# Patient Record
Sex: MALE | Race: ASIAN | NOT HISPANIC OR LATINO | Employment: STUDENT | URBAN - METROPOLITAN AREA
[De-identification: names, ages, dates, MRNs, and addresses within clinical notes are randomized per-mention and may not be internally consistent; named-entity substitution may affect disease eponyms.]

---
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
## 2018-06-04 ENCOUNTER — OFFICE VISIT (OUTPATIENT)
Dept: FAMILY MEDICINE | Facility: CLINIC | Age: 18
End: 2018-06-04
Payer: COMMERCIAL

## 2018-06-04 VITALS
SYSTOLIC BLOOD PRESSURE: 120 MMHG | WEIGHT: 125 LBS | BODY MASS INDEX: 20.83 KG/M2 | HEIGHT: 65 IN | TEMPERATURE: 99.2 F | DIASTOLIC BLOOD PRESSURE: 78 MMHG | RESPIRATION RATE: 14 BRPM | HEART RATE: 60 BPM

## 2018-06-04 DIAGNOSIS — Z72.51 HIGH RISK SEXUAL BEHAVIOR: ICD-10-CM

## 2018-06-04 DIAGNOSIS — Z11.1 SCREENING EXAMINATION FOR PULMONARY TUBERCULOSIS: Primary | ICD-10-CM

## 2018-06-04 PROCEDURE — 86580 TB INTRADERMAL TEST: CPT | Performed by: NURSE PRACTITIONER

## 2018-06-04 PROCEDURE — 87491 CHLMYD TRACH DNA AMP PROBE: CPT | Performed by: NURSE PRACTITIONER

## 2018-06-04 PROCEDURE — 87389 HIV-1 AG W/HIV-1&-2 AB AG IA: CPT | Performed by: NURSE PRACTITIONER

## 2018-06-04 PROCEDURE — 36415 COLL VENOUS BLD VENIPUNCTURE: CPT | Performed by: NURSE PRACTITIONER

## 2018-06-04 PROCEDURE — 90472 IMMUNIZATION ADMIN EACH ADD: CPT | Performed by: NURSE PRACTITIONER

## 2018-06-04 PROCEDURE — 99212 OFFICE O/P EST SF 10 MIN: CPT | Mod: 25 | Performed by: NURSE PRACTITIONER

## 2018-06-04 PROCEDURE — 87591 N.GONORRHOEAE DNA AMP PROB: CPT | Performed by: NURSE PRACTITIONER

## 2018-06-04 PROCEDURE — 90471 IMMUNIZATION ADMIN: CPT | Performed by: NURSE PRACTITIONER

## 2018-06-04 PROCEDURE — 90651 9VHPV VACCINE 2/3 DOSE IM: CPT | Performed by: NURSE PRACTITIONER

## 2018-06-04 NOTE — MR AVS SNAPSHOT
After Visit Summary   6/4/2018    Óscar Donnelly    MRN: [id number]           Patient Information     Date Of Birth          2000        Visit Information        Provider Department      6/4/2018 8:40 AM Toshia Cristina APRN CNP [facility]        Today's Diagnoses     High risk sexual behavior    -  1       Follow-ups after your visit        Follow-up notes from your care team     Return in about 1 year (around 6/4/2019) for Physical Exam.      Your next 10 appointments already scheduled     Jun 07, 2018  8:00 AM CDT   Nurse Only with FM RN VISITS   [facility]    [address] [place] [address], [address]  [facility] [address] [phone]              Who to contact     If you have questions or need follow up information about today's clinic visit or your schedule please contact [facility] directly at [phone].  Normal or non-critical lab and imaging results will be communicated to you by [employer], letter or phone within 4 business days after the clinic has received the results. If you do not hear from us within 7 days, please contact the clinic through [employer] or phone. If you have a critical or abnormal lab result, we will notify you by phone as soon as possible.  Submit refill requests through [employer] or call your pharmacy and they will forward the refill request to us. Please allow 3 business days for your refill to be completed.          Additional Information About Your Visit        MyChart Information     [employer] gives you secure access to your electronic health record. If you see a primary care provider, you can also send messages to your care team and make appointments. If you have questions, please call your primary care clinic.  If you do not have a primary care provider, please call [phone] and they will assist you.        Care EveryWhere ID     This is your Care EveryWhere ID. This could be  "used by other organizations to access your [place] medical records  [id number]        Your Vitals Were     Pulse Temperature Respirations Height BMI (Body Mass Index)       60 99.2  F (37.3  C) (Oral) 14 5' 5\" (1.651 m) 20.8 kg/m2        Blood Pressure from Last 3 Encounters:   06/04/18 120/78   03/30/17 122/80   01/02/16 112/68    Weight from Last 3 Encounters:   06/04/18 125 lb (56.7 kg)   03/30/17 132 lb (59.9 kg) (17 %)*   01/02/16 125 lb 9.6 oz (57 kg) (15 %)*     * Growth percentiles are based on [facility] 2-20 Years data.              We Performed the Following     Chlamydia trachomatis PCR     HIV Antigen Antibody Combo     Neisseria gonorrhoeae PCR        Primary Care Provider Office Phone # Fax #    Brian Dee [phone]  [phone]        Equal Access to Services     [facility]: Hadii aad ku hadasho Soomaali, waaxda luqadaha, qaybta kaalmada adeegyada, theo juárez . So [facility] [phone].    ATENCIÓN: Si habla español, tiene a hyatt disposición servicios gratuitos de asistencia lingüística. Llame al [phone].    We comply with applicable federal civil rights laws and [place] laws. We do not discriminate on the basis of race, color, national origin, age, disability, sex, sexual orientation, or gender identity.            Thank you!     Thank you for choosing [facility]  for your care. Our goal is always to provide you with excellent care. Hearing back from our patients is one way we can continue to improve our services. Please take a few minutes to complete the written survey that you may receive in the mail after your visit with us. Thank you!             Your Updated Medication List - Protect others around you: Learn how to safely use, store and throw away your medicines at www.disposemymeds.org.      Notice  As of 6/4/2018  8:56 AM    You have not been prescribed any medications.      "

## 2018-06-04 NOTE — NURSING NOTE
The patient is asked the following questions today and these are his answers:    -Have you had a mantoux administered in the past 30 days?    No  -Have you had a previous positive Mantoux.  No  -Have you received BCG in the past.  No  -Have you had a live vaccine  (MMR, Varicella, OPV, Yellow Fever) in the last 6 weeks.  No  -Have you had and active  viral or bacterial infection in the past 6 weeks.  No  -Have you received corticosteroids or immunosuppressive agents in the past 6 weeks.  No  -Have you been diagnosed with HIV?  No  -Do you have a maglinancy?  No   .Tamy BIRCH MA

## 2018-06-04 NOTE — PROGRESS NOTES
"  SUBJECTIVE:   Óscar Donnelly is a 18 year old male who presents to clinic today for the following health issues:      Requesting std testing as a precaution. No current symptoms, no recent exposure to anything known of.  He has had new partners recently, but is not in a relationship.    Male partners.    Using condoms.        Problem list and histories reviewed & adjusted, as indicated.  Additional history: as documented    No current outpatient prescriptions on file.     No Known Allergies    Reviewed and updated as needed this visit by clinical staff  Tobacco  Allergies  Meds  Med Hx  Surg Hx  Fam Hx  Soc Hx      Reviewed and updated as needed this visit by Provider         ROS:  Constitutional, HEENT, cardiovascular, pulmonary, gi and gu systems are negative, except as otherwise noted.    OBJECTIVE:     /78 (BP Location: Right arm, Patient Position: Chair, Cuff Size: Adult Regular)  Pulse 60  Temp 99.2  F (37.3  C) (Oral)  Resp 14  Ht 5' 5\" (1.651 m)  Wt 125 lb (56.7 kg)  BMI 20.8 kg/m2  Body mass index is 20.8 kg/(m^2).  GENERAL: healthy, alert and no distress  PSYCH: mentation appears normal, affect normal/bright      Diagnostic Test Results:  none     ASSESSMENT/PLAN:   1. High risk sexual behavior  Reviewed protection against STDs.  - Neisseria gonorrhoeae PCR  - Chlamydia trachomatis PCR  - HIV Antigen Antibody Combo    F/u as needed     JAIDA Raymundo [facility]    "

## 2018-06-05 LAB
C TRACH DNA SPEC QL NAA+PROBE: NEGATIVE
HIV 1+2 AB+HIV1 P24 AG SERPL QL IA: NONREACTIVE
N GONORRHOEA DNA SPEC QL NAA+PROBE: NEGATIVE
SPECIMEN SOURCE: NORMAL
SPECIMEN SOURCE: NORMAL

## 2018-06-06 ENCOUNTER — ALLIED HEALTH/NURSE VISIT (OUTPATIENT)
Dept: NURSING | Facility: CLINIC | Age: 18
End: 2018-06-06
Payer: COMMERCIAL

## 2018-06-06 DIAGNOSIS — Z11.1 SCREENING EXAMINATION FOR PULMONARY TUBERCULOSIS: Primary | ICD-10-CM

## 2018-06-06 LAB
PPDINDURATION: 0 MM (ref 0–5)
PPDREDNESS: 0 MM

## 2018-06-06 PROCEDURE — 99207 ZZC NO CHARGE NURSE ONLY: CPT

## 2018-06-06 NOTE — MR AVS SNAPSHOT
After Visit Summary   6/6/2018    Óscar Donnelly    MRN: [id number]           Patient Information     Date Of Birth          2000        Visit Information        Provider Department      6/6/2018 2:15 PM FM RN VISITS [facility]        Today's Diagnoses     Screening examination for pulmonary tuberculosis    -  1       Follow-ups after your visit        Who to contact     If you have questions or need follow up information about today's clinic visit or your schedule please contact [facility] directly at [phone].  Normal or non-critical lab and imaging results will be communicated to you by [employer], letter or phone within 4 business days after the clinic has received the results. If you do not hear from us within 7 days, please contact the clinic through [employer] or phone. If you have a critical or abnormal lab result, we will notify you by phone as soon as possible.  Submit refill requests through [employer] or call your pharmacy and they will forward the refill request to us. Please allow 3 business days for your refill to be completed.          Additional Information About Your Visit        MyChart Information     [employer] gives you secure access to your electronic health record. If you see a primary care provider, you can also send messages to your care team and make appointments. If you have questions, please call your primary care clinic.  If you do not have a primary care provider, please call [phone] and they will assist you.        Care EveryWhere ID     This is your Care EveryWhere ID. This could be used by other organizations to access your [place] medical records  [id number]         Blood Pressure from Last 3 Encounters:   06/04/18 120/78   03/30/17 122/80   01/02/16 112/68    Weight from Last 3 Encounters:   06/04/18 125 lb (56.7 kg)   03/30/17 132 lb (59.9 kg) (17 %)*   01/02/16 125 lb 9.6 oz (57 kg) (15 %)*     * Growth percentiles are based  on [facility] 2-20 Years data.              Today, you had the following     No orders found for display       Primary Care Provider Office Phone # Fax #    Brian Dee [phone] [address] [facility] [address]        Equal Access to Services     EDUARDOILSA RODOLFO : Hadii aad ku hadasho Soomaali, waaxda luqadaha, qaybta kaalmada adeegyada, waxtianna merlosin hayaan ademadonna valenzuelakathejeremy lafannie ah. So [facility] [phone].    ATENCIÓN: Si habla español, tiene a hyatt disposición servicios gratuitos de asistencia lingüística. Llame al [phone].    We comply with applicable federal civil rights laws and [place] laws. We do not discriminate on the basis of race, color, national origin, age, disability, sex, sexual orientation, or gender identity.            Thank you!     Thank you for choosing [facility]  for your care. Our goal is always to provide you with excellent care. Hearing back from our patients is one way we can continue to improve our services. Please take a few minutes to complete the written survey that you may receive in the mail after your visit with us. Thank you!             Your Updated Medication List - Protect others around you: Learn how to safely use, store and throw away your medicines at www.disposemymeds.org.      Notice  As of 6/6/2018  2:19 PM    You have not been prescribed any medications.

## 2018-06-06 NOTE — PROGRESS NOTES
Mantoux results:   No induration.  No swelling.  No redness.    Signed [facility]. Gave patient print out for own records.     Mayra MEYERS RN, BSN, PHN  Joelle Kamara RN

## 2019-11-08 ENCOUNTER — HEALTH MAINTENANCE LETTER (OUTPATIENT)
Age: 19
End: 2019-11-08

## 2020-01-11 ENCOUNTER — OFFICE VISIT (OUTPATIENT)
Dept: FAMILY MEDICINE | Facility: CLINIC | Age: 20
End: 2020-01-11
Payer: COMMERCIAL

## 2020-01-11 VITALS
HEIGHT: 64 IN | OXYGEN SATURATION: 97 % | DIASTOLIC BLOOD PRESSURE: 78 MMHG | SYSTOLIC BLOOD PRESSURE: 128 MMHG | TEMPERATURE: 97.9 F | BODY MASS INDEX: 22.2 KG/M2 | HEART RATE: 97 BPM | WEIGHT: 130 LBS

## 2020-01-11 DIAGNOSIS — Z11.3 ROUTINE SCREENING FOR STI (SEXUALLY TRANSMITTED INFECTION): ICD-10-CM

## 2020-01-11 DIAGNOSIS — J02.9 SORE THROAT: Primary | ICD-10-CM

## 2020-01-11 PROCEDURE — 87880 STREP A ASSAY W/OPTIC: CPT | Performed by: NURSE PRACTITIONER

## 2020-01-11 PROCEDURE — 86780 TREPONEMA PALLIDUM: CPT | Performed by: NURSE PRACTITIONER

## 2020-01-11 PROCEDURE — 87081 CULTURE SCREEN ONLY: CPT | Performed by: NURSE PRACTITIONER

## 2020-01-11 PROCEDURE — 86696 HERPES SIMPLEX TYPE 2 TEST: CPT | Performed by: NURSE PRACTITIONER

## 2020-01-11 PROCEDURE — 36415 COLL VENOUS BLD VENIPUNCTURE: CPT | Performed by: NURSE PRACTITIONER

## 2020-01-11 PROCEDURE — 87389 HIV-1 AG W/HIV-1&-2 AB AG IA: CPT | Performed by: NURSE PRACTITIONER

## 2020-01-11 PROCEDURE — 99213 OFFICE O/P EST LOW 20 MIN: CPT | Performed by: NURSE PRACTITIONER

## 2020-01-11 PROCEDURE — 86308 HETEROPHILE ANTIBODY SCREEN: CPT | Performed by: NURSE PRACTITIONER

## 2020-01-11 PROCEDURE — 86695 HERPES SIMPLEX TYPE 1 TEST: CPT | Performed by: NURSE PRACTITIONER

## 2020-01-11 ASSESSMENT — ENCOUNTER SYMPTOMS
CHILLS: 1
DIARRHEA: 0
LIGHT-HEADEDNESS: 1
SORE THROAT: 1
DYSURIA: 0
NAUSEA: 0
VOMITING: 0
RHINORRHEA: 0
FATIGUE: 1
HEADACHES: 1
MYALGIAS: 1
HEMATURIA: 0
FEVER: 0
COUGH: 1

## 2020-01-11 ASSESSMENT — MIFFLIN-ST. JEOR: SCORE: 1505.68

## 2020-01-11 NOTE — PROGRESS NOTES
"SUBJECTIVE:   Óscar Donnelly is a 19 year old male presenting with a chief complaint of   Chief Complaint   Patient presents with     Pharyngitis     x 1 week       He is an established patient of [facility].    URI Adult    Onset of symptoms was 1 week(s) ago.  Course of illness is worsening.    Severity moderate  Current and Associated symptoms: chills, ear pain bilateral, sore throat, headache and body aches  Treatment measures tried include Tylenol/Ibuprofen and dayquil.  Predisposing factors include ill contact: roommate and seasonal allergies.  Denies history of asthma, he is a non-smoker.       Also, he requests STI screening. He denies known exposure to STIs. He denies any current urinary symptoms.     Review of Systems   Constitutional: Positive for chills and fatigue. Negative for fever.   HENT: Positive for ear pain and sore throat. Negative for congestion and rhinorrhea.    Respiratory: Positive for cough.    Gastrointestinal: Negative for diarrhea, nausea and vomiting.   Genitourinary: Negative for dysuria, hematuria, penile pain, penile swelling and testicular pain.   Musculoskeletal: Positive for myalgias.   Skin: Negative for rash.   Neurological: Positive for light-headedness and headaches.       History reviewed. No pertinent past medical history.  History reviewed. No pertinent family history.  No current outpatient medications on file.     Social History     Tobacco Use     Smoking status: Never Smoker     Smokeless tobacco: Never Used   Substance Use Topics     Alcohol use: Yes     Comment: occasional/social        OBJECTIVE  /78   Pulse 97   Temp 97.9  F (36.6  C) (Oral)   Ht 1.626 m (5' 4\")   Wt 59 kg (130 lb)   SpO2 97%   BMI 22.31 kg/m      Physical Exam  Vitals signs and nursing note reviewed.   Constitutional:       Appearance: Normal appearance. He is well-developed.   HENT:      Head: Normocephalic and atraumatic.      Right Ear: Ear canal and external ear normal. A middle ear " effusion is present.      Left Ear: Ear canal and external ear normal. A middle ear effusion is present.      Nose: Nose normal. No congestion or rhinorrhea.      Right Sinus: No maxillary sinus tenderness or frontal sinus tenderness.      Left Sinus: No maxillary sinus tenderness or frontal sinus tenderness.      Mouth/Throat:      Pharynx: Posterior oropharyngeal erythema present. No oropharyngeal exudate.      Tonsils: No tonsillar exudate. Swellin+ on the right. 2+ on the left.   Eyes:      Extraocular Movements: Extraocular movements intact.      Conjunctiva/sclera: Conjunctivae normal.      Pupils: Pupils are equal, round, and reactive to light.   Neck:      Musculoskeletal: Normal range of motion and neck supple.   Cardiovascular:      Rate and Rhythm: Normal rate and regular rhythm.      Heart sounds: Normal heart sounds.   Pulmonary:      Effort: Pulmonary effort is normal.      Breath sounds: Normal breath sounds and air entry.   Abdominal:      Hernia: There is no hernia in the right inguinal area or left inguinal area.   Genitourinary:     Penis: Normal.       Scrotum/Testes: Normal. Cremasteric reflex is present.      Epididymis:      Right: Normal.      Left: Normal.   Lymphadenopathy:      Head:      Right side of head: No submandibular or tonsillar adenopathy.      Left side of head: No submandibular or tonsillar adenopathy.      Cervical: No cervical adenopathy.   Skin:     General: Skin is warm and dry.   Neurological:      Mental Status: He is alert and oriented to person, place, and time.   Psychiatric:         Behavior: Behavior is cooperative.         Labs:  Results for orders placed or performed in visit on 20 (from the past 24 hour(s))   Beta strep group A culture   Result Value Ref Range    Specimen Description Throat     Culture Micro       NEGATIVE: No Group A streptococcal antigen detected by immunoassay, await culture report.   Rapid strep screen   Result Value Ref Range     Specimen Description Throat     Rapid Strep A Screen       NEGATIVE: No Group A streptococcal antigen detected by immunoassay, await culture report.         ASSESSMENT:      ICD-10-CM    1. Sore throat J02.9 Beta strep group A culture     Rapid strep screen     Mononucleosis screen     CANCELED: Beta strep group A culture   2. Routine screening for STI (sexually transmitted infection) Z11.3 NEISSERIA GONORRHOEA PCR     CHLAMYDIA TRACHOMATIS PCR     HIV Antigen Antibody Combo     Herpes Simplex Virus 1 and 2 IgG     Treponema Abs w Reflex to RPR and Titer        Medical Decision Making:    Differential Diagnosis:  URI Adult/Peds:  Acute right otitis media, Acute left otitis media, Laryngitis, Mononucleosis, Strep pharyngitis, Tonsilitis, Viral pharyngitis and Viral upper respiratory illness    Serious Comorbid Conditions:  Adult:  None    PLAN:  Discussed with patient that the rapid strep was negative. Will do confirmatory testing. Did recommend doing a mono screen. That was also negative. Favor a viral sore throat. Symptomatic treatment recommendations discussed. Will notify via my chart of negative STI testing or notify via phone if positive screens. Education was added to AVS. Patient was agreeable to plan and verbalized understanding.     Followup:    If not improving in 5-7 days or if condition worsens, follow up with your Primary Care Provider    Patient Instructions   Push Fluids  Plenty of rest  Tylenol and ibuprofen to help with pain or fever  Humidified air can help with congestion  Nasal saline or Flonase nasal spray to help with congestion  Salt water gargles, anesthetic throat spray, or lozenges to help with sore throat.

## 2020-01-12 LAB
BACTERIA SPEC CULT: NORMAL
HETEROPH AB SER QL: NEGATIVE
SPECIMEN SOURCE: NORMAL

## 2020-01-13 LAB
HIV 1+2 AB+HIV1 P24 AG SERPL QL IA: NONREACTIVE
HSV1 IGG SERPL QL IA: >8 AI (ref 0–0.8)
HSV2 IGG SERPL QL IA: <0.2 AI (ref 0–0.8)
T PALLIDUM AB SER QL: NONREACTIVE

## 2020-01-14 ENCOUNTER — TELEPHONE (OUTPATIENT)
Dept: FAMILY MEDICINE | Facility: CLINIC | Age: 20
End: 2020-01-14

## 2020-01-14 DIAGNOSIS — Z11.3 SCREEN FOR SEXUALLY TRANSMITTED DISEASES: Primary | ICD-10-CM

## 2020-01-14 LAB
C TRACH DNA SPEC QL NAA+PROBE: ABNORMAL
N GONORRHOEA DNA SPEC QL NAA+PROBE: ABNORMAL
SPECIMEN SOURCE: ABNORMAL
SPECIMEN SOURCE: ABNORMAL

## 2020-01-14 NOTE — TELEPHONE ENCOUNTER
Called patient and he was seen on Saturday 1/11/2020; urine GC/Chlamydia was not sent out for testing due to inadequate amount of urine collection.  Patient states that he will come in at his convenience and informed him to not urinate 1 hour before the collection. Patient verbalizes understanding and order will placed in his chart.

## 2020-01-19 LAB
DEPRECATED S PYO AG THROAT QL EIA: NORMAL
SPECIMEN SOURCE: NORMAL

## 2020-09-24 ENCOUNTER — OFFICE VISIT (OUTPATIENT)
Dept: OPHTHALMOLOGY | Facility: CLINIC | Age: 20
End: 2020-09-24
Payer: COMMERCIAL

## 2020-09-24 DIAGNOSIS — H52.13 MYOPIA OF BOTH EYES: Primary | ICD-10-CM

## 2020-09-24 DIAGNOSIS — D31.32 NEVUS OF CHOROID OF LEFT EYE: ICD-10-CM

## 2020-09-24 ASSESSMENT — REFRACTION_WEARINGRX
OS_CYLINDER: +0.50
OD_CYLINDER: +0.50
OS_AXIS: 170
OD_AXIS: 005
OD_SPHERE: -1.75
OS_SPHERE: -1.75
SPECS_TYPE: SVL

## 2020-09-24 ASSESSMENT — REFRACTION_MANIFEST
OS_CYLINDER: SPHERE
OD_CYLINDER: SPHERE
OD_SPHERE: -1.75
OS_SPHERE: -1.75

## 2020-09-24 ASSESSMENT — CONF VISUAL FIELD
OS_NORMAL: 1
METHOD: COUNTING FINGERS
OD_NORMAL: 1

## 2020-09-24 ASSESSMENT — CUP TO DISC RATIO
OS_RATIO: 0.35
OD_RATIO: 0.35

## 2020-09-24 ASSESSMENT — TONOMETRY
OS_IOP_MMHG: 11
IOP_METHOD: ICARE
OD_IOP_MMHG: 11

## 2020-09-24 ASSESSMENT — VISUAL ACUITY
OD_CC: 20/20
METHOD: SNELLEN - LINEAR
CORRECTION_TYPE: GLASSES
OS_CC: 20/20

## 2020-09-24 ASSESSMENT — EXTERNAL EXAM - RIGHT EYE: OD_EXAM: NORMAL

## 2020-09-24 ASSESSMENT — EXTERNAL EXAM - LEFT EYE: OS_EXAM: NORMAL

## 2020-09-24 ASSESSMENT — SLIT LAMP EXAM - LIDS
COMMENTS: NORMAL
COMMENTS: NORMAL

## 2020-09-24 NOTE — PROGRESS NOTES
A/P  1.) Myopia each eye  -Largely stable spec Rx, okay to continue with current glasses  -Current CL wearer, defers eval today  -Interested in LASIK. Discussed with pt today. Rec eval with LASIK provider when convenient    2.) Choroidal nevus left eye  -Small, nonsuspicious  -Reviewed with pt, monitor annually with DFE    Monitor 1 year routine    I have confirmed the patient's CC, HPI and reviewed Past Medical History, Past Surgical History, Social History, Family History, Problem List, Medication List and agree with Tech note.     Ivania Perez, OD FAAO FSLS

## 2020-09-24 NOTE — NURSING NOTE
Chief Complaints and History of Present Illnesses   Patient presents with     COMPREHENSIVE EYE EXAM     Chief Complaint(s) and History of Present Illness(es)     COMPREHENSIVE EYE EXAM     Laterality: both eyes    Onset: years ago    Frequency: constantly    Course: stable    Treatments tried: artificial tears    Pain scale: 0/10              Comments     Annual exam. Pt states vision is stable. Would like to get lasik- pass on contacts today. No pain. AT while wearing contacts.    MARIA D Palacio COT 1:39 PM September 24, 2020

## 2020-12-06 ENCOUNTER — HEALTH MAINTENANCE LETTER (OUTPATIENT)
Age: 20
End: 2020-12-06

## 2020-12-22 ENCOUNTER — OFFICE VISIT (OUTPATIENT)
Dept: FAMILY MEDICINE | Facility: CLINIC | Age: 20
End: 2020-12-22
Payer: COMMERCIAL

## 2020-12-22 VITALS
TEMPERATURE: 97.6 F | HEART RATE: 57 BPM | SYSTOLIC BLOOD PRESSURE: 142 MMHG | OXYGEN SATURATION: 98 % | HEIGHT: 65 IN | RESPIRATION RATE: 16 BRPM | DIASTOLIC BLOOD PRESSURE: 87 MMHG | WEIGHT: 140 LBS | BODY MASS INDEX: 23.32 KG/M2

## 2020-12-22 DIAGNOSIS — Z00.00 ROUTINE GENERAL MEDICAL EXAMINATION AT A HEALTH CARE FACILITY: Primary | ICD-10-CM

## 2020-12-22 DIAGNOSIS — Z20.6 HIV EXPOSURE: ICD-10-CM

## 2020-12-22 DIAGNOSIS — G47.26 SHIFT WORK SLEEP DISORDER: ICD-10-CM

## 2020-12-22 PROCEDURE — 86780 TREPONEMA PALLIDUM: CPT | Mod: 90 | Performed by: FAMILY MEDICINE

## 2020-12-22 PROCEDURE — 87591 N.GONORRHOEAE DNA AMP PROB: CPT | Performed by: FAMILY MEDICINE

## 2020-12-22 PROCEDURE — 99395 PREV VISIT EST AGE 18-39: CPT | Mod: 25 | Performed by: FAMILY MEDICINE

## 2020-12-22 PROCEDURE — 90715 TDAP VACCINE 7 YRS/> IM: CPT | Performed by: FAMILY MEDICINE

## 2020-12-22 PROCEDURE — 99000 SPECIMEN HANDLING OFFICE-LAB: CPT | Performed by: FAMILY MEDICINE

## 2020-12-22 PROCEDURE — 99213 OFFICE O/P EST LOW 20 MIN: CPT | Mod: 25 | Performed by: FAMILY MEDICINE

## 2020-12-22 PROCEDURE — 87491 CHLMYD TRACH DNA AMP PROBE: CPT | Performed by: FAMILY MEDICINE

## 2020-12-22 PROCEDURE — 90471 IMMUNIZATION ADMIN: CPT | Performed by: FAMILY MEDICINE

## 2020-12-22 PROCEDURE — 36415 COLL VENOUS BLD VENIPUNCTURE: CPT | Performed by: FAMILY MEDICINE

## 2020-12-22 PROCEDURE — 80048 BASIC METABOLIC PNL TOTAL CA: CPT | Performed by: FAMILY MEDICINE

## 2020-12-22 PROCEDURE — 87389 HIV-1 AG W/HIV-1&-2 AB AG IA: CPT | Performed by: FAMILY MEDICINE

## 2020-12-22 RX ORDER — ZOLPIDEM TARTRATE 5 MG/1
5 TABLET ORAL
Qty: 30 TABLET | Refills: 1 | Status: SHIPPED | OUTPATIENT
Start: 2020-12-22 | End: 2022-04-25

## 2020-12-22 RX ORDER — EMTRICITABINE AND TENOFOVIR DISOPROXIL FUMARATE 200; 300 MG/1; MG/1
1 TABLET, FILM COATED ORAL DAILY
Qty: 90 TABLET | Refills: 0 | Status: SHIPPED | OUTPATIENT
Start: 2020-12-22 | End: 2021-01-20

## 2020-12-22 ASSESSMENT — MIFFLIN-ST. JEOR: SCORE: 1557.95

## 2020-12-22 NOTE — PROGRESS NOTES
SUBJECTIVE:   CC: Óscar Donnelly is an 20 year old male who presents for preventative health visit.       Patient has been advised of split billing requirements and indicates understanding: Yes  Healthy Habits:     Getting at least 3 servings of Calcium per day:  Yes    Bi-annual eye exam:  Yes    Dental care twice a year:  NO    Sleep apnea or symptoms of sleep apnea:  Daytime drowsiness    Diet:  Regular (no restrictions)    Frequency of exercise:  2-3 days/week    Duration of exercise:  45-60 minutes    Taking medications regularly:  Yes    Medication side effects:  Not applicable    PHQ-2 Total Score: 2    Additional concerns today:  Yes        in addition to health maintenance patient would like to discuss the following problem:    Sleep problems ongoing for the past couple of months gotten worst the last couple of weeks has been doing melatonin at night 5mg OTC but has stopped helping symptoms patient also works nights.   Most recently 2 weeks on 1 week off, always nights.    Week off is reverting to a more normal schedule    Discussed potential for impairment and dependence with benzo/Z medications, a limited Rx prescribed      Also, routine STD screening.  Also interested in prep    Reviewed protocol and indications for use    PrEP is for  anyone who is in an ongoing relationship with an HIV-positive partner. It also includes anyone who 1) is not in a mutually monogamous relationship with a partner who recently tested HIV-negative, and   2) is a kennedy or bisexual man who has had anal sex without a condom or been diagnosed with an STD in the past 6 months; or  heterosexual man or woman who does not regularly use condoms during sex with partners of unknown HIV status who are at substantial risk of HIV infection (e.g., people who inject drugs or have bisexual male partners).    Patient meets criteria    He is not having any side effects     Confirmed a negative HIV-1 test immediately prior to continuing  TRUVADA/Descovy for a PrEP indication  Confirmed hepatitis B immunity  Rechecking creatinine clearance (CrCl) >60 mL/min during treatment.   Confirmed that the uninfected individual at high risk is not taking other HIV-1 medications or HBV medications   Evaluated risk/benefit     Counseling/Follow-up   Discussed known safety risks with use of TRUVADA/Descovy for a PrEP indication   Counseled on the importance of scheduled follow-up every 2 to 3 months, including regular HIV-1 screening tests  (at least every 3 months), while taking TRUVADA/Descovy for a PrEP indication to reconfirm [id number] status   Discussed the importance of discontinuing TRUVADA/Descovy for a PrEP indication if seroconversion has occurred, to reduce the development of resistant HIV-1 variants   Counseled on the importance of adherence to daily dosing schedule   Counseled that TRUVADA/Descovy for a PrEP indication should be used only as part of a comprehensive prevention strategy   Educated on practicing safer sex consistently and using condoms correctly   Discussed the importance of the individual knowing their HIV-1 status and, if possible, that of their partner(s)   Discussed the importance of and performed screening for sexually transmitted infections (STIs), such as syphilis  and gonorrhea, that can facilitate HIV-1 transmission   Offered HBV vaccination as appropriate   Provided education on where information about TRUVADA/Descovy for a PrEP indication can be accessed   Discussed potential adverse reactions          Today's PHQ-2 Score:   PHQ-2 ( 1999 Pfizer) 12/17/2020   Q1: Little interest or pleasure in doing things 1   Q2: Feeling down, depressed or hopeless 1   PHQ-2 Score 2   Q1: Little interest or pleasure in doing things Several days   Q2: Feeling down, depressed or hopeless Several days   PHQ-2 Score 2       Abuse: Current or Past(Physical, Sexual or Emotional)- No  Do you feel safe in your environment? Yes        Social  History     Tobacco Use     Smoking status: Never Smoker     Smokeless tobacco: Never Used   Substance Use Topics     Alcohol use: Yes     Comment: occasional/social      If you drink alcohol do you typically have >3 drinks per day or >7 drinks per week? No    Alcohol Use 12/17/2020   Prescreen: >3 drinks/day or >7 drinks/week? No   No flowsheet data found.    Last PSA: No results found for: PSA    Reviewed orders with patient. Reviewed health maintenance and updated orders accordingly - Yes  Lab work is in process  Labs reviewed in EPIC  BP Readings from Last 3 Encounters:   12/22/20 (!) 142/87   01/11/20 128/78   06/04/18 120/78    Wt Readings from Last 3 Encounters:   12/22/20 63.5 kg (140 lb)   01/11/20 59 kg (130 lb)   06/04/18 56.7 kg (125 lb)                  Patient Active Problem List   Diagnosis     Acute otitis media     History reviewed. No pertinent surgical history.    Social History     Tobacco Use     Smoking status: Never Smoker     Smokeless tobacco: Never Used   Substance Use Topics     Alcohol use: Yes     Comment: occasional/social      Family History   Problem Relation Age of Onset     Glaucoma No family hx of      Macular Degeneration No family hx of          No current outpatient medications on file.       Reviewed and updated as needed this visit by clinical staff                 Reviewed and updated as needed this visit by Provider                    Review of Systems  10 point ROS of systems including Constitutional, Eyes, Respiratory, Cardiovascular, Gastroenterology, Genitourinary, Integumentary, Muscularskeletal, Psychiatric were all negative except for pertinent positives noted in my HPI.      OBJECTIVE:   There were no vitals taken for this visit.    Physical Exam    General Appearance: Pleasant, alert, in no acute respiratory distress.  Head Exam: Normal. Normocephalic, atraumatic. No sinus tenderness.  Eye Exam: Normal external eye, conjunctiva, lids. ROSIE.  Ear Exam: Normal  "auditory canals and external ears. Non-tender.  Left TM-normal. Right TM-normal.  Neck Exam: Supple, no obvious thyroid enlargement  Chest/Respiratory Exam: Normal, comfortable, easy respirations. Chest wall normal. Lungs are clear to auscultation. No wheezing, crackles, or rhonchi.  Cardiovascular Exam: Regular rate and rhythm. No murmur, gallop, or rubs.  Musculoskeletal Exam: Back is non-tender, full ROM of upper and lower extremities.  Skin: no rash, warm and dry.    Neurologic Exam: Nonfocal, no tremor. Normal gait.  Psychiatric Exam: Alert - appropriate, normal affect      ASSESSMENT/PLAN:       ICD-10-CM    1. Routine general medical examination at a health care facility  Z00.00    2. 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 HIV exposure  Z20.6 Neisseria gonorrhoeae PCR     Treponema Abs w Reflex to RPR and Titer     HIV Antigen Antibody Combo     Chlamydia trachomatis PCR     Basic metabolic panel     emtricitabine-tenofovir (TRUVADA) 200-300 MG per tablet     HIV Antigen Antibody Combo     Creatinine       Patient has been advised of split billing requirements and indicates understanding: Yes  COUNSELING:   Reviewed preventive health counseling, as reflected in patient instructions       Regular exercise       Healthy diet/nutrition    Estimated body mass index is 22.31 kg/m  as calculated from the following:    Height as of 1/11/20: 1.626 m (5' 4\").    Weight as of 1/11/20: 59 kg (130 lb).         He reports that he has never smoked. He has never used smokeless tobacco.      Counseling Resources:  ATP IV Guidelines  Pooled Cohorts Equation Calculator  FRAX Risk Assessment  ICSI Preventive Guidelines  Dietary Guidelines for Americans, 2010  USDA's MyPlate  ASA Prophylaxis  Lung CA Screening    Jovanny Osmani Mota MD  [facility]  "

## 2020-12-23 LAB
ANION GAP SERPL CALCULATED.3IONS-SCNC: 5 MMOL/L (ref 3–14)
BUN SERPL-MCNC: 22 MG/DL (ref 7–30)
C TRACH DNA SPEC QL NAA+PROBE: NEGATIVE
CALCIUM SERPL-MCNC: 9.1 MG/DL (ref 8.5–10.1)
CHLORIDE SERPL-SCNC: 106 MMOL/L (ref 94–109)
CO2 SERPL-SCNC: 29 MMOL/L (ref 20–32)
CREAT SERPL-MCNC: 0.99 MG/DL (ref 0.66–1.25)
GFR SERPL CREATININE-BSD FRML MDRD: >90 ML/MIN/{1.73_M2}
GLUCOSE SERPL-MCNC: 88 MG/DL (ref 70–99)
HIV 1+2 AB+HIV1 P24 AG SERPL QL IA: NONREACTIVE
N GONORRHOEA DNA SPEC QL NAA+PROBE: NEGATIVE
POTASSIUM SERPL-SCNC: 3.7 MMOL/L (ref 3.4–5.3)
SODIUM SERPL-SCNC: 140 MMOL/L (ref 133–144)
SPECIMEN SOURCE: NORMAL
SPECIMEN SOURCE: NORMAL
T PALLIDUM AB SER QL: NONREACTIVE

## 2021-01-20 ENCOUNTER — MYC REFILL (OUTPATIENT)
Dept: FAMILY MEDICINE | Facility: CLINIC | Age: 21
End: 2021-01-20

## 2021-01-20 DIAGNOSIS — Z20.6 HIV EXPOSURE: ICD-10-CM

## 2021-01-20 RX ORDER — EMTRICITABINE AND TENOFOVIR DISOPROXIL FUMARATE 200; 300 MG/1; MG/1
1 TABLET, FILM COATED ORAL DAILY
Qty: 90 TABLET | Refills: 0 | Status: SHIPPED | OUTPATIENT
Start: 2021-01-20 | End: 2022-10-20

## 2021-01-20 NOTE — TELEPHONE ENCOUNTER
Routing refill request to provider for review/approval because:  Drug not on the [facility] refill protocol    Jamila BROOKS RN

## 2021-04-06 ENCOUNTER — OFFICE VISIT (OUTPATIENT)
Dept: FAMILY MEDICINE | Facility: CLINIC | Age: 21
End: 2021-04-06
Payer: COMMERCIAL

## 2021-04-06 VITALS
HEART RATE: 68 BPM | BODY MASS INDEX: 22.49 KG/M2 | TEMPERATURE: 98.3 F | HEIGHT: 65 IN | WEIGHT: 135 LBS | SYSTOLIC BLOOD PRESSURE: 118 MMHG | DIASTOLIC BLOOD PRESSURE: 72 MMHG | OXYGEN SATURATION: 96 %

## 2021-04-06 DIAGNOSIS — Z20.6 HIV EXPOSURE: Primary | ICD-10-CM

## 2021-04-06 PROCEDURE — 87389 HIV-1 AG W/HIV-1&-2 AB AG IA: CPT | Performed by: FAMILY MEDICINE

## 2021-04-06 PROCEDURE — 36415 COLL VENOUS BLD VENIPUNCTURE: CPT | Performed by: FAMILY MEDICINE

## 2021-04-06 PROCEDURE — 82565 ASSAY OF CREATININE: CPT | Performed by: FAMILY MEDICINE

## 2021-04-06 PROCEDURE — 99213 OFFICE O/P EST LOW 20 MIN: CPT | Performed by: FAMILY MEDICINE

## 2021-04-06 RX ORDER — EMTRICITABINE AND TENOFOVIR DISOPROXIL FUMARATE 200; 300 MG/1; MG/1
1 TABLET, FILM COATED ORAL DAILY
Qty: 90 TABLET | Refills: 0 | Status: SHIPPED | OUTPATIENT
Start: 2021-04-06 | End: 2021-07-15

## 2021-04-06 ASSESSMENT — MIFFLIN-ST. JEOR: SCORE: 1530.27

## 2021-04-06 NOTE — NURSING NOTE
"Chief Complaint   Patient presents with     Recheck Medication     initial /72 (BP Location: Right arm, Cuff Size: Adult Regular)   Pulse 68   Temp 98.3  F (36.8  C) (Tympanic)   Ht 1.645 m (5' 4.75\")   Wt 61.2 kg (135 lb)   SpO2 96%   BMI 22.64 kg/m   Estimated body mass index is 22.64 kg/m  as calculated from the following:    Height as of this encounter: 1.645 m (5' 4.75\").    Weight as of this encounter: 61.2 kg (135 lb).  BP completed using cuff size: regular.   R arm      Health Maintenance that is potentially due pending provider review:  NONE    n/a    Matthew Mckay ma  "

## 2021-04-06 NOTE — PROGRESS NOTES
"    Assessment & Plan     HIV exposure    Also Patient is here for follow-up of Truvada/Descovy  Reviewed protocol and indications for use    Patient continues to meet criteria    He is not having any side effects     Confirmed a negative HIV-1 test immediately prior to continuing TRUVADA/Descovy for a PrEP indication  Confirmed hepatitis B immunity  Rechecking creatinine clearance (CrCl) >60 mL/min during treatment.   Confirmed that the uninfected individual at high risk is not taking other HIV-1 medications or HBV medications   Evaluated risk/benefit     Counseling/Follow-up   Discussed known safety risks with use of TRUVADA/Descovy for a PrEP indication   Counseled on the importance of scheduled follow-up every 2 to 3 months, including regular HIV-1 screening tests  (at least every 3 months), while taking TRUVADA/Descovy for a PrEP indication to reconfirm [id number] status     Discussed the importance of the individual knowing their HIV-1 status and, if possible, that of their partner(s)   Discussed the importance of and performed screening for sexually transmitted infections (STIs), such as syphilis  and gonorrhea, that can facilitate HIV-1 transmission       - **HIV Antigen Antibody Combo FUTURE anytime  - Creatinine  - emtricitabine-tenofovir (TRUVADA) 200-300 MG per tablet; Take 1 tablet by mouth daily      No follow-ups on file.    Jovanny Mota MD  [facility]   Óscar is a 21 year old who presents for the following health issues     HPI     Medication Followup of Truvada    Taking Medication as prescribed: yes    Side Effects:  None    Medication Helping Symptoms:  yes             Objective    /72 (BP Location: Right arm, Cuff Size: Adult Regular)   Pulse 68   Temp 98.3  F (36.8  C) (Tympanic)   Ht 1.645 m (5' 4.75\")   Wt 61.2 kg (135 lb)   SpO2 96%   BMI 22.64 kg/m    Body mass index is 22.64 kg/m .  Physical Exam   GENERAL: healthy, alert and " no distress

## 2021-04-07 LAB
CREAT SERPL-MCNC: 1.13 MG/DL (ref 0.66–1.25)
GFR SERPL CREATININE-BSD FRML MDRD: >90 ML/MIN/{1.73_M2}
HIV 1+2 AB+HIV1 P24 AG SERPL QL IA: NONREACTIVE

## 2021-04-08 ENCOUNTER — OFFICE VISIT (OUTPATIENT)
Dept: URGENT CARE | Facility: URGENT CARE | Age: 21
End: 2021-04-08
Payer: COMMERCIAL

## 2021-04-08 ENCOUNTER — RESULTS ONLY (OUTPATIENT)
Dept: LAB | Age: 21
End: 2021-04-08

## 2021-04-08 DIAGNOSIS — Z53.9 ERRONEOUS ENCOUNTER--DISREGARD: Primary | ICD-10-CM

## 2021-04-08 LAB
LABORATORY COMMENT REPORT: NORMAL
SARS-COV-2 RNA RESP QL NAA+PROBE: NEGATIVE
SARS-COV-2 RNA RESP QL NAA+PROBE: NORMAL
SPECIMEN SOURCE: NORMAL
SPECIMEN SOURCE: NORMAL

## 2021-07-14 ENCOUNTER — LAB (OUTPATIENT)
Dept: LAB | Facility: CLINIC | Age: 21
End: 2021-07-14
Payer: COMMERCIAL

## 2021-07-14 ENCOUNTER — MYC REFILL (OUTPATIENT)
Dept: FAMILY MEDICINE | Facility: CLINIC | Age: 21
End: 2021-07-14

## 2021-07-14 DIAGNOSIS — Z20.6 HIV EXPOSURE: ICD-10-CM

## 2021-07-14 LAB
CREAT SERPL-MCNC: 1.02 MG/DL (ref 0.7–1.3)
GFR SERPL CREATININE-BSD FRML MDRD: >90 ML/MIN/1.73M2
HIV 1+2 AB+HIV1 P24 AG SERPL QL IA: NEGATIVE

## 2021-07-14 PROCEDURE — 36415 COLL VENOUS BLD VENIPUNCTURE: CPT

## 2021-07-14 PROCEDURE — 82565 ASSAY OF CREATININE: CPT

## 2021-07-14 PROCEDURE — 87389 HIV-1 AG W/HIV-1&-2 AB AG IA: CPT

## 2021-07-15 RX ORDER — EMTRICITABINE AND TENOFOVIR DISOPROXIL FUMARATE 200; 300 MG/1; MG/1
1 TABLET, FILM COATED ORAL DAILY
Qty: 90 TABLET | Refills: 0 | Status: SHIPPED | OUTPATIENT
Start: 2021-07-15 | End: 2021-10-19

## 2021-07-15 RX ORDER — EMTRICITABINE AND TENOFOVIR DISOPROXIL FUMARATE 200; 300 MG/1; MG/1
1 TABLET, FILM COATED ORAL DAILY
Qty: 90 TABLET | Refills: 0 | OUTPATIENT
Start: 2021-07-15

## 2021-08-13 ENCOUNTER — MYC REFILL (OUTPATIENT)
Dept: FAMILY MEDICINE | Facility: CLINIC | Age: 21
End: 2021-08-13

## 2021-08-13 DIAGNOSIS — Z20.6 HIV EXPOSURE: ICD-10-CM

## 2021-08-13 RX ORDER — EMTRICITABINE AND TENOFOVIR DISOPROXIL FUMARATE 200; 300 MG/1; MG/1
1 TABLET, FILM COATED ORAL DAILY
Qty: 90 TABLET | Refills: 0 | Status: CANCELLED | OUTPATIENT
Start: 2021-08-13

## 2021-09-15 ENCOUNTER — TELEPHONE (OUTPATIENT)
Dept: FAMILY MEDICINE | Facility: CLINIC | Age: 21
End: 2021-09-15

## 2021-09-15 NOTE — TELEPHONE ENCOUNTER
Prior Authorization Retail Medication Request    Medication/Dose: Emtricitabine-Tenofovir 200-300 mg (Generic Truvada)  ICD code (if different than what is on RX):  PrEP  Previously Tried and Failed:  Brand Truvada   Rationale:  Copay assistance card's benefit has been exhausted for the year.     Insurance Name:  [employer]  Insurance ID:  [id number]      Pharmacy Information (if different than what is on RX)  Name:  [facility]  Phone:  [phone]    Marci Mallory CPhT  [facility]  [phone]   ]

## 2021-09-16 NOTE — TELEPHONE ENCOUNTER
Central Prior Authorization Team   Phone: [phone]      Additional question set received, completed and faxed back to insurance @ [phone].

## 2021-09-16 NOTE — TELEPHONE ENCOUNTER
Central Prior Authorization Team   Phone: [phone]      PA Initiation    Medication: Emtricitabine-Tenofovir 200-300 mg (Generic Truvada) - INITIATED  Insurance Company: [employer] - Phone [phone] Fax [phone]  Pharmacy Filling the Rx: [facility] - [place], [place] - [address]  Filling Pharmacy Phone: [phone]  Filling Pharmacy Fax: [phone]  Start Date: 9/16/2021

## 2021-09-20 NOTE — TELEPHONE ENCOUNTER
Central Prior Authorization Team   Phone: [phone]      Prior Authorization Approval    Authorization Effective Date: 9/16/2021  Authorization Expiration Date: 9/16/2022  Medication: Emtricitabine-Tenofovir 200-300 mg (Generic Truvada) - APPROVED  Approved Dose/Quantity: 90 FOR 90  Reference #:     Insurance Company: [employer] - Phone [phone] Fax [phone]  Expected CoPay:       CoPay Card Available:      Foundation Assistance Needed:    Which Pharmacy is filling the prescription (Not needed for infusion/clinic administered): [facility] - [place], [place] - [address]  Pharmacy Notified: Yes  Patient Notified: Yes (**Instructed pharmacy to notify patient when script is ready to /ship.**)

## 2021-09-25 ENCOUNTER — HEALTH MAINTENANCE LETTER (OUTPATIENT)
Age: 21
End: 2021-09-25

## 2021-09-26 ENCOUNTER — E-VISIT (OUTPATIENT)
Dept: URGENT CARE | Facility: URGENT CARE | Age: 21
End: 2021-09-26
Payer: COMMERCIAL

## 2021-09-26 DIAGNOSIS — Z20.822 SUSPECTED COVID-19 VIRUS INFECTION: Primary | ICD-10-CM

## 2021-09-26 PROCEDURE — 99421 OL DIG E/M SVC 5-10 MIN: CPT | Performed by: PREVENTIVE MEDICINE

## 2021-09-26 NOTE — PATIENT INSTRUCTIONS
Dear Óscar Donnelly,    Your symptoms show that you may have coronavirus (COVID-19). This illness can cause fever, cough and trouble breathing. Many people get a mild case and get better on their own. Some people can get very sick.    Will I be tested for COVID-19?  We would like to test you for Covid-19 virus. I have placed orders for this test.     For all employees or close contacts (except [employer] [place] and [employer] - see below), go to your WriteOn home page and scroll down to the section that says  You have an appointment that needs to be scheduled  and click the large green button that says  Schedule Now  and follow the steps to find the next available opening.     If you are unable to complete these steps or if you cannot find any available times, please call [phone] to schedule employee testing.     [employer] employees or close contacts, please call [phone].   [employer]) employees or close contacts call [phone].    Return to work/school/ guidance:  Please let your workplace manager and staffing office know when your quarantine ends     We can t give you an exact date as it depends on the above. You can calculate this on your own or work with your manager/staffing office to calculate this. (For example if you were exposed on 10/4, you would have to quarantine for 14 full days. That would be through 10/18. You could return on 10/19.)      If you receive a positive COVID-19 test result, follow the guidance of the those who are giving you the results. Usually the return to work is 10 (or in some cases 20 days from symptom onset.) If you work at [employer], you must also be cleared by Employee Occupational Health and Safety to return to work.        If you receive a negative COVID-19 test result and did not have a high risk exposure to someone with a known positive COVID-19 test, you can return to work once you're free of fever for 24 hours without fever-reducing medication  and your symptoms are improving or resolved.      If you receive a negative COVID-19 test and If you had a high risk exposure to someone who has tested positive for COVID-19 then you can return to work 14 days after your last contact with the positive individual    Note: If you have ongoing exposure to the covid positive person, this quarantine period may be more than 14 days. (For example, if you are continued to be exposed to your child who tested positive and cannot isolate from them, then the quarantine of 7-14 days can't start until your child is no longer contagious. This is typically 10 days from onset of the child's symptoms. So the total duration may be 17-24 days in this case.)    Sign up for [employer].   We know it's scary to hear that you might have COVID-19. We want to track your symptoms to make sure you're okay over the next 2 weeks. Please look for an email from [employer]--this is a free, online program that we'll use to keep in touch. To sign up, follow the link in the email you will receive. Learn more at [email or web address]    How can I take care of myself?    Get lots of rest. Drink extra fluids (unless a doctor has told you not to)    Take Tylenol (acetaminophen) or ibuprofen for fever or pain. If you have liver or kidney problems, ask your family doctor if it's okay to take Tylenol o ibuprofen    If you have other health problems (like cancer, heart failure, an organ transplant or severe kidney disease): Call your specialty clinic if you don't feel better in the next 2 days.    Know when to call 911. Emergency warning signs include:  o Trouble breathing or shortness of breath  o Pain or pressure in the chest that doesn't go away  o Feeling confused like you haven't felt before, or not being able to wake up  o Bluish-colored lips or face    Where can I get more information?   [employer] [place] - About COVID-19:   [email or web address]    CDC - What to Do If You're  Sick:   www.cdc.gov/coronavirus/2019-ncov/about/steps-when-sick.html

## 2021-10-18 ENCOUNTER — LAB (OUTPATIENT)
Dept: LAB | Facility: CLINIC | Age: 21
End: 2021-10-18
Payer: COMMERCIAL

## 2021-10-18 DIAGNOSIS — Z20.6 HIV EXPOSURE: ICD-10-CM

## 2021-10-18 LAB — HIV 1+2 AB+HIV1 P24 AG SERPL QL IA: NONREACTIVE

## 2021-10-18 PROCEDURE — 36415 COLL VENOUS BLD VENIPUNCTURE: CPT

## 2021-10-18 PROCEDURE — 82565 ASSAY OF CREATININE: CPT

## 2021-10-18 PROCEDURE — 87389 HIV-1 AG W/HIV-1&-2 AB AG IA: CPT

## 2021-10-19 LAB
CREAT SERPL-MCNC: 1.11 MG/DL (ref 0.66–1.25)
GFR SERPL CREATININE-BSD FRML MDRD: >90 ML/MIN/1.73M2

## 2021-10-19 RX ORDER — EMTRICITABINE AND TENOFOVIR DISOPROXIL FUMARATE 200; 300 MG/1; MG/1
1 TABLET, FILM COATED ORAL DAILY
Qty: 90 TABLET | Refills: 0 | Status: SHIPPED | OUTPATIENT
Start: 2021-10-19 | End: 2021-10-21

## 2021-10-20 ENCOUNTER — MYC REFILL (OUTPATIENT)
Dept: LAB | Facility: CLINIC | Age: 21
End: 2021-10-20

## 2021-10-20 DIAGNOSIS — Z20.6 HIV EXPOSURE: ICD-10-CM

## 2021-10-20 RX ORDER — EMTRICITABINE AND TENOFOVIR DISOPROXIL FUMARATE 200; 300 MG/1; MG/1
1 TABLET, FILM COATED ORAL DAILY
Qty: 90 TABLET | Refills: 0 | Status: CANCELLED | OUTPATIENT
Start: 2021-10-20

## 2021-10-21 ENCOUNTER — MYC REFILL (OUTPATIENT)
Dept: LAB | Facility: CLINIC | Age: 21
End: 2021-10-21

## 2021-10-21 DIAGNOSIS — Z20.6 HIV EXPOSURE: ICD-10-CM

## 2021-10-21 NOTE — TELEPHONE ENCOUNTER
Pipo MOSHER:    Routing refill request to provider for review/approval because:  Drug not on the [facility] refill protocol   Lab done 10/18:    HIV = non-reactive    Kavitha Wheeler RN

## 2021-10-22 RX ORDER — EMTRICITABINE AND TENOFOVIR DISOPROXIL FUMARATE 200; 300 MG/1; MG/1
1 TABLET, FILM COATED ORAL DAILY
Qty: 90 TABLET | Refills: 0 | Status: SHIPPED | OUTPATIENT
Start: 2021-10-22 | End: 2022-01-06

## 2021-11-04 ENCOUNTER — VIRTUAL VISIT (OUTPATIENT)
Dept: FAMILY MEDICINE | Facility: CLINIC | Age: 21
End: 2021-11-04
Payer: COMMERCIAL

## 2021-11-04 DIAGNOSIS — Z11.3 SCREEN FOR SEXUALLY TRANSMITTED DISEASES: Primary | ICD-10-CM

## 2021-11-04 PROCEDURE — 99213 OFFICE O/P EST LOW 20 MIN: CPT | Mod: 95 | Performed by: FAMILY MEDICINE

## 2021-11-04 NOTE — PROGRESS NOTES
Óscar is a 21 year old who is being evaluated via a billable video visit.      How would you like to obtain your AVS? Alba  If the video visit is dropped, the invitation should be resent by: Alba or else Text to cell phone: [phone]  Will anyone else be joining your video visit? No    Video Start Time: 4:57 PM    Assessment & Plan     Screen for sexually transmitted diseases    - Chlamydia trachomatis PCR; Future  - Neisseria gonorrhoeae PCR; Future  - Treponema Abs w Reflex to RPR and Titer; Future    Also Patient is here for follow-up of Truvada/Descovy  Reviewed protocol and indications for use    PrEP is for  anyone who is in an ongoing relationship with an HIV-positive partner. It also includes anyone who 1) is not in a mutually monogamous relationship with a partner who recently tested HIV-negative, and   2) is a kennedy or bisexual man who has had anal sex without a condom or been diagnosed with an STD in the past 6 months; or  heterosexual man or woman who does not regularly use condoms during sex with partners of unknown HIV status who are at substantial risk of HIV infection (e.g., people who inject drugs or have bisexual male partners).    Patient continues to meet criteria    He is not having any side effects     Confirmed a negative HIV-1 test immediately prior to continuing TRUVADA/Descovy for a PrEP indication  Confirmed hepatitis B immunity  Rechecking creatinine clearance (CrCl) >60 mL/min during treatment.   Confirmed that the uninfected individual at high risk is not taking other HIV-1 medications or HBV medications   Evaluated risk/benefit     Counseling/Follow-up   Discussed known safety risks with use of TRUVADA/Descovy for a PrEP indication   Counseled on the importance of scheduled follow-up every 2 to 3 months, including regular HIV-1 screening tests  (at least every 3 months), while taking TRUVADA/Descovy for a PrEP indication to reconfirm [id number] status   Discussed the  importance of discontinuing TRUVADA/Descovy for a PrEP indication if seroconversion has occurred, to reduce the development of resistant HIV-1 variants   Counseled on the importance of adherence to daily dosing schedule   Counseled that TRUVADA/Descovy for a PrEP indication should be used only as part of a comprehensive prevention strategy   Educated on practicing safer sex consistently and using condoms correctly   Discussed the importance of the individual knowing their HIV-1 status and, if possible, that of their partner(s)   Discussed the importance of and performed screening for sexually transmitted infections (STIs), such as syphilis  and gonorrhea, that can facilitate HIV-1 transmission   Offered HBV vaccination as appropriate   Provided education on where information about TRUVADA/Descovy for a PrEP indication can be accessed   Discussed potential adverse reactions      No follow-ups on file.    Jovanny Mota MD  [facility]    Ellyn Cantrell is a 21 year old who presents for the following health issues     HPI     Medication Followup of truvada 200-300 mg    Taking Medication as prescribed: yes    Side Effects:  none    Medication Helping Symptoms:  yes     Also disussed expanded screening    Review of Systems         Objective           Vitals:  No vitals were obtained today due to virtual visit.    Physical Exam   GENERAL: Healthy, alert and no distress  EYES: Eyes grossly normal to inspection.  No discharge or erythema, or obvious scleral/conjunctival abnormalities.  RESP: No audible wheeze, cough, or visible cyanosis.  No visible retractions or increased work of breathing.    SKIN: Visible skin clear. No significant rash, abnormal pigmentation or lesions.  NEURO: Cranial nerves grossly intact.  Mentation and speech appropriate for age.  PSYCH: Mentation appears normal, affect normal/bright, judgement and insight intact, normal speech and appearance  well-groomed.            Video-Visit Details    Type of service:  Video Visit    Video End Time:5:03 PM    Originating Location (pt. Location): Home    Distant Location (provider location):  [facility]     Platform used for Video Visit: [employer]

## 2022-01-04 ENCOUNTER — DOCUMENTATION ONLY (OUTPATIENT)
Dept: LAB | Facility: CLINIC | Age: 22
End: 2022-01-04
Payer: COMMERCIAL

## 2022-01-04 NOTE — PROGRESS NOTES
Please place or confirm orders for upcoming lab appointment on 01/05/2022. Thank you.    According to patient appt notes:    Scheduling Notes: Truvada Prep Labs: HIV, STI, and Creatinine     Theres no Creatinine order. If lab is needed please order. Thank you.

## 2022-01-04 NOTE — PROGRESS NOTES
There are active and standing orders for Treponema, chlamydia, gonorrhea and standing HIV Cr orders in

## 2022-01-05 ENCOUNTER — LAB (OUTPATIENT)
Dept: URGENT CARE | Facility: URGENT CARE | Age: 22
End: 2022-01-05
Attending: PREVENTIVE MEDICINE
Payer: COMMERCIAL

## 2022-01-05 ENCOUNTER — LAB (OUTPATIENT)
Dept: LAB | Facility: CLINIC | Age: 22
End: 2022-01-05
Payer: COMMERCIAL

## 2022-01-05 DIAGNOSIS — Z20.822 SUSPECTED COVID-19 VIRUS INFECTION: ICD-10-CM

## 2022-01-05 DIAGNOSIS — Z20.6 HIV EXPOSURE: ICD-10-CM

## 2022-01-05 DIAGNOSIS — Z11.3 SCREEN FOR SEXUALLY TRANSMITTED DISEASES: ICD-10-CM

## 2022-01-05 LAB
CREAT SERPL-MCNC: 1.03 MG/DL (ref 0.66–1.25)
GFR SERPL CREATININE-BSD FRML MDRD: >90 ML/MIN/1.73M2
HIV 1+2 AB+HIV1 P24 AG SERPL QL IA: NONREACTIVE
T PALLIDUM AB SER QL: NONREACTIVE

## 2022-01-05 PROCEDURE — 82565 ASSAY OF CREATININE: CPT

## 2022-01-05 PROCEDURE — 87591 N.GONORRHOEAE DNA AMP PROB: CPT

## 2022-01-05 PROCEDURE — U0005 INFEC AGEN DETEC AMPLI PROBE: HCPCS

## 2022-01-05 PROCEDURE — 36415 COLL VENOUS BLD VENIPUNCTURE: CPT

## 2022-01-05 PROCEDURE — 87389 HIV-1 AG W/HIV-1&-2 AB AG IA: CPT

## 2022-01-05 PROCEDURE — 99207 PR NO CHARGE LOS: CPT

## 2022-01-05 PROCEDURE — 86780 TREPONEMA PALLIDUM: CPT

## 2022-01-05 PROCEDURE — U0003 INFECTIOUS AGENT DETECTION BY NUCLEIC ACID (DNA OR RNA); SEVERE ACUTE RESPIRATORY SYNDROME CORONAVIRUS 2 (SARS-COV-2) (CORONAVIRUS DISEASE [COVID-19]), AMPLIFIED PROBE TECHNIQUE, MAKING USE OF HIGH THROUGHPUT TECHNOLOGIES AS DESCRIBED BY CMS-2020-01-R: HCPCS

## 2022-01-05 PROCEDURE — 87491 CHLMYD TRACH DNA AMP PROBE: CPT

## 2022-01-06 LAB
C TRACH DNA SPEC QL NAA+PROBE: NEGATIVE
N GONORRHOEA DNA SPEC QL NAA+PROBE: NEGATIVE
SARS-COV-2 RNA RESP QL NAA+PROBE: NEGATIVE

## 2022-01-06 RX ORDER — EMTRICITABINE AND TENOFOVIR DISOPROXIL FUMARATE 200; 300 MG/1; MG/1
1 TABLET, FILM COATED ORAL DAILY
Qty: 90 TABLET | Refills: 0 | Status: SHIPPED | OUTPATIENT
Start: 2022-01-06 | End: 2022-04-10

## 2022-01-19 ENCOUNTER — OFFICE VISIT (OUTPATIENT)
Dept: FAMILY MEDICINE | Facility: CLINIC | Age: 22
End: 2022-01-19
Payer: COMMERCIAL

## 2022-01-19 VITALS
HEART RATE: 68 BPM | SYSTOLIC BLOOD PRESSURE: 115 MMHG | BODY MASS INDEX: 21.8 KG/M2 | OXYGEN SATURATION: 100 % | WEIGHT: 130 LBS | DIASTOLIC BLOOD PRESSURE: 82 MMHG

## 2022-01-19 DIAGNOSIS — Z20.2 EXPOSURE TO CHLAMYDIA: Primary | ICD-10-CM

## 2022-01-19 PROCEDURE — 87491 CHLMYD TRACH DNA AMP PROBE: CPT | Performed by: PHYSICIAN ASSISTANT

## 2022-01-19 PROCEDURE — 87591 N.GONORRHOEAE DNA AMP PROB: CPT | Performed by: PHYSICIAN ASSISTANT

## 2022-01-19 PROCEDURE — 99213 OFFICE O/P EST LOW 20 MIN: CPT | Performed by: PHYSICIAN ASSISTANT

## 2022-01-19 RX ORDER — AZITHROMYCIN 500 MG/1
1000 TABLET, FILM COATED ORAL DAILY
Qty: 2 TABLET | Refills: 0 | Status: SHIPPED | OUTPATIENT
Start: 2022-01-19 | End: 2022-01-20

## 2022-01-19 NOTE — PROGRESS NOTES
Assessment & Plan     Exposure to chlamydia  Await labs.   Discussed further testing if new partners  1 male partner, partner treated  - Chlamydia trachomatis PCR; Future  - Neisseria gonorrhoeae PCR; Future  - azithromycin (ZITHROMAX) 500 MG tablet; Take 2 tablets (1,000 mg) by mouth daily for 1 day  - Chlamydia trachomatis PCR  - Neisseria gonorrhoeae PCR    No follow-ups on file.    SIMON Masters [facility] TAYLOR Cantrell is a 21 year old who presents for the following health issues     STD    History of Present Illness       He eats 2-3 servings of fruits and vegetables daily.He consumes 1 sweetened beverage(s) daily.He exercises with enough effort to increase his heart rate 10 to 19 minutes per day.  He exercises with enough effort to increase his heart rate 3 or less days per week.   He is taking medications regularly.       Patient is not having any systems. Partner tested positive 2 days ago.  Patient had recent STI screening with PCP a few weeks ago.  Continues Truvada PrEP      Review of Systems   Constitutional, HEENT, cardiovascular, pulmonary, gi and gu systems are negative, except as otherwise noted.      Objective    /82 (BP Location: Right arm, Patient Position: Sitting, Cuff Size: Adult Regular)   Pulse 68   Wt 59 kg (130 lb)   SpO2 100%   BMI 21.80 kg/m    Body mass index is 21.8 kg/m .  Physical Exam   GENERAL APPEARANCE: healthy, alert and no distress  PSYCH: mentation appears normal and affect normal/bright

## 2022-01-20 LAB
C TRACH DNA SPEC QL NAA+PROBE: NEGATIVE
N GONORRHOEA DNA SPEC QL NAA+PROBE: NEGATIVE

## 2022-01-27 ENCOUNTER — OFFICE VISIT (OUTPATIENT)
Dept: FAMILY MEDICINE | Facility: CLINIC | Age: 22
End: 2022-01-27
Payer: COMMERCIAL

## 2022-01-27 VITALS — SYSTOLIC BLOOD PRESSURE: 110 MMHG | DIASTOLIC BLOOD PRESSURE: 64 MMHG

## 2022-01-27 DIAGNOSIS — L29.9 LOCALIZED PRURITUS: Primary | ICD-10-CM

## 2022-01-27 DIAGNOSIS — L21.9 SEBORRHEIC DERMATITIS: ICD-10-CM

## 2022-01-27 PROCEDURE — 99213 OFFICE O/P EST LOW 20 MIN: CPT | Performed by: PHYSICIAN ASSISTANT

## 2022-01-27 RX ORDER — KETOCONAZOLE 20 MG/ML
SHAMPOO TOPICAL
Qty: 120 ML | Refills: 6 | Status: SHIPPED | OUTPATIENT
Start: 2022-01-27 | End: 2023-02-03

## 2022-01-27 RX ORDER — FLUOCINONIDE TOPICAL SOLUTION USP, 0.05% 0.5 MG/ML
SOLUTION TOPICAL
Qty: 60 ML | Refills: 3 | Status: SHIPPED | OUTPATIENT
Start: 2022-01-27

## 2022-01-27 NOTE — LETTER
1/27/2022         RE: Óscar Donnelly  [id number] [facility] [address]        Dear Colleague,    Thank you for referring your patient, Óscar Donnelly, to the [facility]. Please see a copy of my visit note below.    [facility] Dermatology Note  Encounter Date: Jan 27, 2022  Office Visit   ____________________________________________    Assessment & Plan:    1. Seborrheic Dermatitis, localized pruritis  Chronic condition that cannot be cured, but it can be managed.     Scalp:   Wash scalp daily with a medicated shampoo discussed with provider-prescription strength nizoral. Wet affected area daily, apply shampoo and lather, let sit for 3-5 minutes and then rinse.   If multiple shampoos discussed begin  two prescription shampoos or one prescription shampoo and one over the counter shampoo  (examples: Head and shoulders, Selsen Blue, Ketoconazole, T-Sal, and/or T-gel.     Lidex: Apply a thin layer to affected area on scalp 2x a day x 4 weeks, tapering with improvement. Do not apply to face or body folds.     Side effects of topical steroids including but not limited to atrophy (skin thinning), striae (stretch marks) telangiectasias, steroid acne, and others. Do not apply to normal skin. Do not apply to discolored skin that does not have rash present.          Follow-up: if not improving. Yearly refills.     Labs and office visit notes reviewed:  1/19/22      ____________________________________________________    HPI:  Mr. Óscar Donnelly is a(n) 21 year old male who presents today as a new patient for evaluation of rash on scalp Patient states this has been present for a few months.  Patient reports the following symptoms: itching, intermittent redness and greasy flaking.  Patient reports the following previous treatments: otc nizoral provided some improvement, vit e oil, not helpful.  Patient reports the following modifying factors: none.  Associated  symptoms: none.  Patient has no other skin complaints today.  Remainder of the HPI, Meds, PMH, Allergies, FH, and SH was reviewed in chart.         Medications:  Current Outpatient Medications   Medication     emtricitabine-tenofovir (TRUVADA) 200-300 MG per tablet     fluocinonide (LIDEX) 0.05 % external solution     ketoconazole (NIZORAL) 2 % external shampoo     zolpidem (AMBIEN) 5 MG tablet     emtricitabine-tenofovir (TRUVADA) 200-300 MG per tablet     No current facility-administered medications for this visit.        Past Medical History:   Patient Active Problem List   Diagnosis     Acute otitis media     History reviewed. No pertinent past medical history.    Past Surgical History:   History reviewed. No pertinent surgical history.    Family History:  Family History   Problem Relation Age of Onset     Glaucoma No family hx of      Macular Degeneration No family hx of        Social History:  Social History     Tobacco Use     Smoking status: Never Smoker     Smokeless tobacco: Never Used   Substance Use Topics     Alcohol use: Yes     Comment: occasional/social           Review Of Systems:  Skin: flakes in hair/on scalp  Eyes: negative  Ears/Nose/Throat: negative  Respiratory: No shortness of breath, dyspnea on exertion, cough, or hemoptysis  Cardiovascular: negative  Gastrointestinal: negative  Genitourinary: negative  Musculoskeletal: negative  Neurologic: negative  Psychiatric: negative  Hematologic/Lymphatic/Immunologic: negative  Endocrine: negative      Objective:     /64   Eyes: Conjunctivae/lids: Normal   ENT: Lips:  Not seen- patient masked  MSK: Normal  Cardiovascular: Peripheral edema none  Pulm: Breathing Normal  Neuro/Psych: Orientation: Normal; Mood/Affect: Normal, NAD, WDWN  Pt accompanied by: self  Following areas examined: Scalp, exposed face- patient wearing mask, neck  Manley skin type: ii  Findings:  Mild flaking of scalp. No erythema, atrophy or scarring noted.  Per pt photo-  pink scaly macules on frontal scalp          CC No referring provider defined for this encounter. on close of this encounter.        Again, thank you for allowing me to participate in the care of your patient.        Sincerely,        Jeni Wright PA-C

## 2022-01-27 NOTE — PATIENT INSTRUCTIONS
1. Seborrheic Dermatitis, localized pruritis  Chronic condition that cannot be cured, but it can be managed.     Scalp:   Wash scalp daily with a medicated shampoo discussed with provider-prescription strength nizoral. Wet affected area daily, apply shampoo and lather, let sit for 3-5 minutes and then rinse.   If multiple shampoos discussed begin  two prescription shampoos or one prescription shampoo and one over the counter shampoo  (examples: Head and shoulders, Selsen Blue, Ketoconazole, T-Sal, and/or T-gel.     Lidex: Apply a thin layer to affected area on scalp 2x a day x 4 weeks, tapering with improvement. Do not apply to face or body folds.     Side effects of topical steroids including but not limited to atrophy (skin thinning), striae (stretch marks) telangiectasias, steroid acne, and others. Do not apply to normal skin. Do not apply to discolored skin that does not have rash present.

## 2022-01-27 NOTE — PROGRESS NOTES
[facility] Dermatology Note  Encounter Date: Jan 27, 2022  Office Visit   ____________________________________________    Assessment & Plan:    1. Seborrheic Dermatitis, localized pruritis  Chronic condition that cannot be cured, but it can be managed.     Scalp:   Wash scalp daily with a medicated shampoo discussed with provider-prescription strength nizoral. Wet affected area daily, apply shampoo and lather, let sit for 3-5 minutes and then rinse.   If multiple shampoos discussed begin  two prescription shampoos or one prescription shampoo and one over the counter shampoo  (examples: Head and shoulders, Selsen Blue, Ketoconazole, T-Sal, and/or T-gel.     Lidex: Apply a thin layer to affected area on scalp 2x a day x 4 weeks, tapering with improvement. Do not apply to face or body folds.     Side effects of topical steroids including but not limited to atrophy (skin thinning), striae (stretch marks) telangiectasias, steroid acne, and others. Do not apply to normal skin. Do not apply to discolored skin that does not have rash present.          Follow-up: if not improving. Yearly refills.     Labs and office visit notes reviewed:  1/19/22      ____________________________________________________    HPI:  Mr. Óscar Donnelly is a(n) 21 year old male who presents today as a new patient for evaluation of rash on scalp Patient states this has been present for a few months.  Patient reports the following symptoms: itching, intermittent redness and greasy flaking.  Patient reports the following previous treatments: otc nizoral provided some improvement, vit e oil, not helpful.  Patient reports the following modifying factors: none.  Associated symptoms: none.  Patient has no other skin complaints today.  Remainder of the HPI, Meds, PMH, Allergies, FH, and SH was reviewed in chart.         Medications:  Current Outpatient Medications   Medication     emtricitabine-tenofovir (TRUVADA) 200-300 MG per  tablet     fluocinonide (LIDEX) 0.05 % external solution     ketoconazole (NIZORAL) 2 % external shampoo     zolpidem (AMBIEN) 5 MG tablet     emtricitabine-tenofovir (TRUVADA) 200-300 MG per tablet     No current facility-administered medications for this visit.        Past Medical History:   Patient Active Problem List   Diagnosis     Acute otitis media     History reviewed. No pertinent past medical history.    Past Surgical History:   History reviewed. No pertinent surgical history.    Family History:  Family History   Problem Relation Age of Onset     Glaucoma No family hx of      Macular Degeneration No family hx of        Social History:  Social History     Tobacco Use     Smoking status: Never Smoker     Smokeless tobacco: Never Used   Substance Use Topics     Alcohol use: Yes     Comment: occasional/social           Review Of Systems:  Skin: flakes in hair/on scalp  Eyes: negative  Ears/Nose/Throat: negative  Respiratory: No shortness of breath, dyspnea on exertion, cough, or hemoptysis  Cardiovascular: negative  Gastrointestinal: negative  Genitourinary: negative  Musculoskeletal: negative  Neurologic: negative  Psychiatric: negative  Hematologic/Lymphatic/Immunologic: negative  Endocrine: negative      Objective:     /64   Eyes: Conjunctivae/lids: Normal   ENT: Lips:  Not seen- patient masked  MSK: Normal  Cardiovascular: Peripheral edema none  Pulm: Breathing Normal  Neuro/Psych: Orientation: Normal; Mood/Affect: Normal, NAD, WDWN  Pt accompanied by: self  Following areas examined: Scalp, exposed face- patient wearing mask, neck  Manley skin type: ii  Findings:  Mild flaking of scalp. No erythema, atrophy or scarring noted.  Per pt photo- pink scaly macules on frontal scalp          CC No referring provider defined for this encounter. on close of this encounter.

## 2022-03-12 ENCOUNTER — HEALTH MAINTENANCE LETTER (OUTPATIENT)
Age: 22
End: 2022-03-12

## 2022-04-07 ENCOUNTER — DOCUMENTATION ONLY (OUTPATIENT)
Dept: LAB | Facility: CLINIC | Age: 22
End: 2022-04-07
Payer: COMMERCIAL

## 2022-04-07 DIAGNOSIS — Z11.3 SCREEN FOR SEXUALLY TRANSMITTED DISEASES: ICD-10-CM

## 2022-04-07 DIAGNOSIS — Z20.6 HIV EXPOSURE: Primary | ICD-10-CM

## 2022-04-07 NOTE — PROGRESS NOTES
Óscar Donnelly has an upcoming lab appointment:    Future Appointments   Date Time Provider Department Center   4/8/2022  9:45 AM RD LAB [facility]   4/25/2022  9:00 AM Jovanny Mota MD [facility]     Patient is scheduled for the following lab(s): Routine HIV and STD testing while on Truvada.    There is no order available. Please review and place either future orders or HMPO (Review of Health Maintenance Protocol Orders), as appropriate.    There are no preventive care reminders to display for this patient.  Sofie Sylvester

## 2022-04-08 ENCOUNTER — LAB (OUTPATIENT)
Dept: LAB | Facility: CLINIC | Age: 22
End: 2022-04-08
Payer: COMMERCIAL

## 2022-04-08 DIAGNOSIS — Z11.3 SCREEN FOR SEXUALLY TRANSMITTED DISEASES: ICD-10-CM

## 2022-04-08 DIAGNOSIS — Z20.6 HIV EXPOSURE: ICD-10-CM

## 2022-04-08 LAB
CREAT SERPL-MCNC: 1.1 MG/DL (ref 0.66–1.25)
GFR SERPL CREATININE-BSD FRML MDRD: >90 ML/MIN/1.73M2
HIV 1+2 AB+HIV1 P24 AG SERPL QL IA: NONREACTIVE
T PALLIDUM AB SER QL: NONREACTIVE

## 2022-04-08 PROCEDURE — 82565 ASSAY OF CREATININE: CPT

## 2022-04-08 PROCEDURE — 87491 CHLMYD TRACH DNA AMP PROBE: CPT

## 2022-04-08 PROCEDURE — 87389 HIV-1 AG W/HIV-1&-2 AB AG IA: CPT

## 2022-04-08 PROCEDURE — 86780 TREPONEMA PALLIDUM: CPT

## 2022-04-08 PROCEDURE — 87591 N.GONORRHOEAE DNA AMP PROB: CPT

## 2022-04-08 PROCEDURE — 36415 COLL VENOUS BLD VENIPUNCTURE: CPT

## 2022-04-09 LAB
C TRACH DNA SPEC QL NAA+PROBE: NEGATIVE
N GONORRHOEA DNA SPEC QL NAA+PROBE: NEGATIVE

## 2022-04-10 RX ORDER — EMTRICITABINE AND TENOFOVIR DISOPROXIL FUMARATE 200; 300 MG/1; MG/1
1 TABLET, FILM COATED ORAL DAILY
Qty: 90 TABLET | Refills: 0 | Status: SHIPPED | OUTPATIENT
Start: 2022-04-10 | End: 2022-07-15

## 2022-04-25 ENCOUNTER — OFFICE VISIT (OUTPATIENT)
Dept: FAMILY MEDICINE | Facility: CLINIC | Age: 22
End: 2022-04-25
Payer: COMMERCIAL

## 2022-04-25 VITALS
HEIGHT: 65 IN | WEIGHT: 128.6 LBS | DIASTOLIC BLOOD PRESSURE: 76 MMHG | BODY MASS INDEX: 21.43 KG/M2 | TEMPERATURE: 97.7 F | OXYGEN SATURATION: 100 % | HEART RATE: 66 BPM | SYSTOLIC BLOOD PRESSURE: 112 MMHG

## 2022-04-25 DIAGNOSIS — Z71.1 CONCERN ABOUT STD IN MALE WITHOUT DIAGNOSIS: Primary | ICD-10-CM

## 2022-04-25 PROCEDURE — 87491 CHLMYD TRACH DNA AMP PROBE: CPT | Performed by: FAMILY MEDICINE

## 2022-04-25 PROCEDURE — 99213 OFFICE O/P EST LOW 20 MIN: CPT | Performed by: FAMILY MEDICINE

## 2022-04-25 PROCEDURE — 87591 N.GONORRHOEAE DNA AMP PROB: CPT | Performed by: FAMILY MEDICINE

## 2022-04-25 ASSESSMENT — PAIN SCALES - GENERAL: PAINLEVEL: NO PAIN (0)

## 2022-04-25 NOTE — PROGRESS NOTES
Assessment & Plan     Concern about STD in male without diagnosis    Already had HIV labs done and truvada refill    Recent exposure, though and we discussed oral and rectal testing    - Neisseria gonorrhoeae PCR  - Chlamydia trachomatis PCR  - NEISSERIA GONORRHOEA PCR  - CHLAMYDIA TRACHOMATIS PCR    Return in about 3 months (around 7/25/2022) for Lab Work.    Jovanny Mota MD  [facility]      Ellyn Cantrell is a 22 year old who presents for the following health issues     History of Present Illness       Reason for visit:  Routine 6-12 month Truvada follow up    He eats 2-3 servings of fruits and vegetables daily.He consumes 1 sweetened beverage(s) daily.He exercises with enough effort to increase his heart rate 20 to 29 minutes per day.  He exercises with enough effort to increase his heart rate 4 days per week.   He is taking medications regularly.       Medication Followup of Truvada     Taking Medication as prescribed: yes    Side Effects:  None    Medication Helping Symptoms:  not applicable     Also Patient is here for follow-up of Truvada/Descovy  Reviewed protocol and indications for use    PrEP is for  anyone who is in an ongoing relationship with an HIV-positive partner. It also includes anyone who 1) is not in a mutually monogamous relationship with a partner who recently tested HIV-negative, and   2) is a kennedy or bisexual man who has had anal sex without a condom or been diagnosed with an STD in the past 6 months; or  heterosexual man or woman who does not regularly use condoms during sex with partners of unknown HIV status who are at substantial risk of HIV infection (e.g., people who inject drugs or have bisexual male partners).    Patient continues to meet criteria    He is not having any side effects     Confirmed a negative HIV-1 test immediately prior to continuing TRUVADA/Descovy for a PrEP indication  Confirmed hepatitis B immunity  Rechecking  "creatinine clearance (CrCl) >60 mL/min during treatment.   Confirmed that the uninfected individual at high risk is not taking other HIV-1 medications or HBV medications   Evaluated risk/benefit     Counseling/Follow-up   Discussed known safety risks with use of TRUVADA/Descovy for a PrEP indication   Counseled on the importance of scheduled follow-up every 2 to 3 months, including regular HIV-1 screening tests  (at least every 3 months), while taking TRUVADA/Descovy for a PrEP indication to reconfirm [id number] status   Discussed the importance of discontinuing TRUVADA/Descovy for a PrEP indication if seroconversion has occurred, to reduce the development of resistant HIV-1 variants   Counseled on the importance of adherence to daily dosing schedule   Counseled that TRUVADA/Descovy for a PrEP indication should be used only as part of a comprehensive prevention strategy   Educated on practicing safer sex consistently and using condoms correctly   Discussed the importance of the individual knowing their HIV-1 status and, if possible, that of their partner(s)   Discussed the importance of and performed screening for sexually transmitted infections (STIs), such as syphilis  and gonorrhea, that can facilitate HIV-1 transmission   Offered HBV vaccination as appropriate   Provided education on where information about TRUVADA/Descovy for a PrEP indication can be accessed   Discussed potential adverse reactions        Review of Systems   Constitutional, HEENT, cardiovascular, pulmonary, gi and gu systems are negative, except as otherwise noted.      Objective    /76 (BP Location: Right arm, Patient Position: Sitting, Cuff Size: Adult Regular)   Pulse 66   Temp 97.7  F (36.5  C) (Tympanic)   Ht 1.645 m (5' 4.75\")   Wt 58.3 kg (128 lb 9.6 oz)   SpO2 100%   BMI 21.57 kg/m    Body mass index is 21.57 kg/m .  Physical Exam   GENERAL: healthy, alert and no distress  Rectal exam normal no lesions                "

## 2022-04-26 LAB
C TRACH DNA SPEC QL NAA+PROBE: NEGATIVE
C TRACH DNA SPEC QL NAA+PROBE: NEGATIVE
N GONORRHOEA DNA SPEC QL NAA+PROBE: NEGATIVE
N GONORRHOEA DNA SPEC QL NAA+PROBE: NEGATIVE

## 2022-05-27 ENCOUNTER — E-VISIT (OUTPATIENT)
Dept: URGENT CARE | Facility: URGENT CARE | Age: 22
End: 2022-05-27
Payer: COMMERCIAL

## 2022-05-27 DIAGNOSIS — H01.001 BLEPHARITIS OF RIGHT UPPER EYELID, UNSPECIFIED TYPE: Primary | ICD-10-CM

## 2022-05-27 PROCEDURE — 99422 OL DIG E/M SVC 11-20 MIN: CPT | Performed by: PREVENTIVE MEDICINE

## 2022-05-27 RX ORDER — ERYTHROMYCIN 5 MG/G
0.5 OINTMENT OPHTHALMIC 3 TIMES DAILY
Qty: 10.5 G | Refills: 0 | Status: SHIPPED | OUTPATIENT
Start: 2022-05-27 | End: 2022-06-03

## 2022-07-14 ENCOUNTER — LAB (OUTPATIENT)
Dept: LAB | Facility: CLINIC | Age: 22
End: 2022-07-14
Payer: COMMERCIAL

## 2022-07-14 DIAGNOSIS — Z20.6 HIV EXPOSURE: ICD-10-CM

## 2022-07-14 LAB
CREAT SERPL-MCNC: 1.01 MG/DL (ref 0.66–1.25)
GFR SERPL CREATININE-BSD FRML MDRD: >90 ML/MIN/1.73M2
HIV 1+2 AB+HIV1 P24 AG SERPL QL IA: NONREACTIVE

## 2022-07-14 PROCEDURE — 87389 HIV-1 AG W/HIV-1&-2 AB AG IA: CPT

## 2022-07-14 PROCEDURE — 36415 COLL VENOUS BLD VENIPUNCTURE: CPT

## 2022-07-14 PROCEDURE — 82565 ASSAY OF CREATININE: CPT

## 2022-07-15 RX ORDER — EMTRICITABINE AND TENOFOVIR DISOPROXIL FUMARATE 200; 300 MG/1; MG/1
1 TABLET, FILM COATED ORAL DAILY
Qty: 90 TABLET | Refills: 0 | Status: SHIPPED | OUTPATIENT
Start: 2022-07-15 | End: 2022-10-20

## 2022-10-19 ENCOUNTER — LAB (OUTPATIENT)
Dept: LAB | Facility: CLINIC | Age: 22
End: 2022-10-19
Payer: COMMERCIAL

## 2022-10-19 DIAGNOSIS — Z20.6 HIV EXPOSURE: ICD-10-CM

## 2022-10-19 LAB
CREAT SERPL-MCNC: 1.08 MG/DL (ref 0.66–1.25)
GFR SERPL CREATININE-BSD FRML MDRD: >90 ML/MIN/1.73M2
HIV 1+2 AB+HIV1 P24 AG SERPL QL IA: NONREACTIVE

## 2022-10-19 PROCEDURE — 82565 ASSAY OF CREATININE: CPT

## 2022-10-19 PROCEDURE — 87389 HIV-1 AG W/HIV-1&-2 AB AG IA: CPT

## 2022-10-19 PROCEDURE — 36415 COLL VENOUS BLD VENIPUNCTURE: CPT

## 2022-10-20 ENCOUNTER — TELEPHONE (OUTPATIENT)
Dept: FAMILY MEDICINE | Facility: CLINIC | Age: 22
End: 2022-10-20

## 2022-10-20 RX ORDER — EMTRICITABINE AND TENOFOVIR DISOPROXIL FUMARATE 200; 300 MG/1; MG/1
1 TABLET, FILM COATED ORAL DAILY
Qty: 90 TABLET | Refills: 0 | Status: SHIPPED | OUTPATIENT
Start: 2022-10-20 | End: 2023-02-03

## 2022-10-20 NOTE — TELEPHONE ENCOUNTER
Central Prior Authorization Team   Phone: [phone]    PA Initiation    Medication: Truvada 200-300mg tablets (emtricitabine-tenofovi 200-300mg) requires a prior authorization  Insurance Company: [employer] - Phone [phone] Fax [phone]  Pharmacy Filling the Rx: [facility] - [place], [place] - [address]  Filling Pharmacy Phone: [phone]  Filling Pharmacy Fax:    Start Date: 10/20/2022

## 2022-10-20 NOTE — TELEPHONE ENCOUNTER
[facility] Prior Authorization Team Request    Medication: Truvada 200-300mg tablets (emtricitabine-tenofovi 200-300mg) requires a prior authorization  Dosing:  Take 1 tablet by mouth once daily  NDC (required for Medicaid members): [address]-24    Insurance PREFERRED ONE [place]  BIN: [id number]  PCN: [id number]  Grp: [id number]  ID: [id number]    CoverMyMeds Key (if applicable):     Additional documentation: tried to run as generic and brand and med still needs a prior authorization    Filling Pharmacy: [facility]  Phone Number: [phone]  Contact: [facility] [place] [facility] ([id number]) please send all responses to this pool.  Pharmacy NPI (required for Medicaid members):  [id number]

## 2022-10-20 NOTE — TELEPHONE ENCOUNTER
Prior Authorization Approval    Authorization Effective Date: 10/20/2022  Authorization Expiration Date: 10/20/2023  Medication: Truvada 200-300mg tablets (emtricitabine-tenofovi 200-300mg) requires a prior authorization  Approved Dose/Quantity:   Reference #:     Insurance Company: [employer] - Phone [phone] Fax [phone]  Expected CoPay:       CoPay Card Available:      Foundation Assistance Needed:    Which Pharmacy is filling the prescription (Not needed for infusion/clinic administered): [facility] - [place], [place] - [address]  Pharmacy Notified: Yes  Patient Notified: Yes

## 2022-11-03 ENCOUNTER — IMMUNIZATION (OUTPATIENT)
Dept: NURSING | Facility: CLINIC | Age: 22
End: 2022-11-03
Payer: COMMERCIAL

## 2022-11-03 PROCEDURE — 0124A COVID-19,PF,PFIZER BOOSTER BIVALENT: CPT

## 2022-11-03 PROCEDURE — 91312 COVID-19,PF,PFIZER BOOSTER BIVALENT: CPT

## 2022-11-11 ENCOUNTER — OFFICE VISIT (OUTPATIENT)
Dept: OPHTHALMOLOGY | Facility: CLINIC | Age: 22
End: 2022-11-11
Payer: COMMERCIAL

## 2022-11-11 DIAGNOSIS — Z98.890 S/P LASIK SURGERY: Primary | ICD-10-CM

## 2022-11-11 PROCEDURE — 92014 COMPRE OPH EXAM EST PT 1/>: CPT | Performed by: OPTOMETRIST

## 2022-11-11 ASSESSMENT — KERATOMETRY
OS_K2POWER_DIOPTERS: 41.50
OS_AXISANGLE2_DEGREES: 176
OD_K1POWER_DIOPTERS: 40.75
OD_K2POWER_DIOPTERS: 41.00
OD_AXISANGLE2_DEGREES: 145
OS_K1POWER_DIOPTERS: 41.25

## 2022-11-11 ASSESSMENT — VISUAL ACUITY
METHOD_MR: DECLINES MR
OS_SC: 20/20
METHOD: SNELLEN - LINEAR
OD_SC: 20/20

## 2022-11-11 ASSESSMENT — EXTERNAL EXAM - RIGHT EYE: OD_EXAM: NORMAL

## 2022-11-11 ASSESSMENT — CONF VISUAL FIELD
OD_NORMAL: 1
OS_INFERIOR_TEMPORAL_RESTRICTION: 0
OS_SUPERIOR_NASAL_RESTRICTION: 0
OD_INFERIOR_TEMPORAL_RESTRICTION: 0
OS_NORMAL: 1
METHOD: COUNTING FINGERS
OD_SUPERIOR_TEMPORAL_RESTRICTION: 0
OD_SUPERIOR_NASAL_RESTRICTION: 0
OS_SUPERIOR_TEMPORAL_RESTRICTION: 0
OS_INFERIOR_NASAL_RESTRICTION: 0
OD_INFERIOR_NASAL_RESTRICTION: 0

## 2022-11-11 ASSESSMENT — TONOMETRY
IOP_METHOD: ICARE
OS_IOP_MMHG: 10
OD_IOP_MMHG: 13

## 2022-11-11 ASSESSMENT — CUP TO DISC RATIO
OS_RATIO: 0.35
OD_RATIO: 0.35

## 2022-11-11 ASSESSMENT — EXTERNAL EXAM - LEFT EYE: OS_EXAM: NORMAL

## 2022-11-11 ASSESSMENT — SLIT LAMP EXAM - LIDS
COMMENTS: NORMAL
COMMENTS: NORMAL

## 2022-11-11 NOTE — PROGRESS NOTES
A/P  1.) s/p LASIK OU  -Doing well, excellent uncorrected acuity  -Asymptomatic for dryness/glare/halo  -Monitor    2.) Choroidal nevus left eye  -Stable, monitor    Monitor 1-2 years comprehensive, sooner prn    I have confirmed the patient's CC, HPI and reviewed Past Medical History, Past Surgical History, Social History, Family History, Problem List, Medication List and agree with Tech note.     Ivania Perez, OD FAAO FSLS

## 2022-11-11 NOTE — NURSING NOTE
Chief Complaints and History of Present Illnesses   Patient presents with     Annual Eye Exam     Pt here for annual eye exam.     Chief Complaint(s) and History of Present Illness(es)     Annual Eye Exam            Laterality: both eyes    Associated symptoms: Negative for eye pain and headache    Comments: Pt here for annual eye exam.          Comments    Pt had LASIK each eye about 2 years ago. Vision is stable. No concerns.   KOLBY SCHUMACHER 1:09 PM November 11, 2022

## 2023-01-24 ENCOUNTER — LAB (OUTPATIENT)
Dept: LAB | Facility: CLINIC | Age: 23
End: 2023-01-24
Payer: COMMERCIAL

## 2023-01-24 DIAGNOSIS — Z20.6 HIV EXPOSURE: ICD-10-CM

## 2023-01-24 LAB
CREAT SERPL-MCNC: 1.06 MG/DL (ref 0.67–1.17)
GFR SERPL CREATININE-BSD FRML MDRD: >90 ML/MIN/1.73M2
HIV 1+2 AB+HIV1 P24 AG SERPL QL IA: NONREACTIVE

## 2023-01-24 PROCEDURE — 82565 ASSAY OF CREATININE: CPT

## 2023-01-24 PROCEDURE — 87389 HIV-1 AG W/HIV-1&-2 AB AG IA: CPT

## 2023-01-24 PROCEDURE — 36415 COLL VENOUS BLD VENIPUNCTURE: CPT

## 2023-01-27 ENCOUNTER — MYC REFILL (OUTPATIENT)
Dept: LAB | Facility: CLINIC | Age: 23
End: 2023-01-27
Payer: COMMERCIAL

## 2023-01-27 DIAGNOSIS — Z20.6 HIV EXPOSURE: ICD-10-CM

## 2023-01-27 RX ORDER — EMTRICITABINE AND TENOFOVIR DISOPROXIL FUMARATE 200; 300 MG/1; MG/1
1 TABLET, FILM COATED ORAL DAILY
Qty: 90 TABLET | Refills: 0 | Status: CANCELLED | OUTPATIENT
Start: 2023-01-27

## 2023-01-30 NOTE — TELEPHONE ENCOUNTER
Please let this patient know he needs to establish care and get labs for further refills.  Okay for virtual visit with me    Monique Bang DO

## 2023-01-31 NOTE — TELEPHONE ENCOUNTER
Called patient and left a voicemail message to call the clinic and schedule an appointment to establish care/ med check.    Kisha Disla

## 2023-02-01 ENCOUNTER — TELEPHONE (OUTPATIENT)
Dept: FAMILY MEDICINE | Facility: CLINIC | Age: 23
End: 2023-02-01
Payer: COMMERCIAL

## 2023-02-01 NOTE — TELEPHONE ENCOUNTER
Patient calling asking if virtual visit Monday 2/6 is enough for an establish care appointment with provider here in order to get refills. Advised patient that establish care appointment has to be in person but if he scheduled that we could see about bridging medication until that appointment.    Did notice [facility], where [facility], had tried to contact him and leave a voicemail stating he can make a virtual visit with them to get medication refills which patient states he rather do that for now just to get refills sooner. Then get an establish care visit with a provider at [place] to move care over to here.    Offered to transfer to central scheduling but pt stated he will call.    Jos Sandra RN   [facility]

## 2023-02-03 ENCOUNTER — VIRTUAL VISIT (OUTPATIENT)
Dept: FAMILY MEDICINE | Facility: CLINIC | Age: 23
End: 2023-02-03
Payer: COMMERCIAL

## 2023-02-03 DIAGNOSIS — Z11.3 SCREEN FOR SEXUALLY TRANSMITTED DISEASES: ICD-10-CM

## 2023-02-03 DIAGNOSIS — Z20.6 HIV EXPOSURE: Primary | ICD-10-CM

## 2023-02-03 DIAGNOSIS — L21.9 SEBORRHEIC DERMATITIS: ICD-10-CM

## 2023-02-03 DIAGNOSIS — L29.9 LOCALIZED PRURITUS: ICD-10-CM

## 2023-02-03 PROCEDURE — 99214 OFFICE O/P EST MOD 30 MIN: CPT | Mod: 95

## 2023-02-03 RX ORDER — EMTRICITABINE AND TENOFOVIR DISOPROXIL FUMARATE 200; 300 MG/1; MG/1
1 TABLET, FILM COATED ORAL DAILY
Qty: 90 TABLET | Refills: 0 | Status: SHIPPED | OUTPATIENT
Start: 2023-02-03 | End: 2023-05-03

## 2023-02-03 RX ORDER — KETOCONAZOLE 20 MG/ML
SHAMPOO TOPICAL
Qty: 120 ML | Refills: 6 | Status: SHIPPED | OUTPATIENT
Start: 2023-02-03 | End: 2023-12-28

## 2023-02-04 NOTE — PATIENT INSTRUCTIONS
Continue every 3 month lab monitoring: next due 05/2023  Continue every 6 month follow up visit (virtual/phone)  Continue every 1 year in person visit for annual    Please let me know when you want STI testing added on to routine HIV/kidney labs. - current plan is to do these (urine sample G/C) with your next labs in May.    Pedro

## 2023-02-07 NOTE — TELEPHONE ENCOUNTER
Patient is scheduled to establish care with Pedro Wiley in May.    LAMBERT Hurtado  [facility]

## 2023-04-22 ENCOUNTER — HEALTH MAINTENANCE LETTER (OUTPATIENT)
Age: 23
End: 2023-04-22

## 2023-05-02 ASSESSMENT — ENCOUNTER SYMPTOMS
DIARRHEA: 0
HEARTBURN: 0
FREQUENCY: 0
SORE THROAT: 0
COUGH: 0
ABDOMINAL PAIN: 0
NAUSEA: 0
PARESTHESIAS: 0
CHILLS: 0
HEMATOCHEZIA: 0
ARTHRALGIAS: 0
SHORTNESS OF BREATH: 0
MYALGIAS: 0
DYSURIA: 0
PALPITATIONS: 0
WEAKNESS: 0
HEADACHES: 0
EYE PAIN: 0
HEMATURIA: 0
JOINT SWELLING: 0
DIZZINESS: 0
FEVER: 0
CONSTIPATION: 0
NERVOUS/ANXIOUS: 0

## 2023-05-03 ENCOUNTER — OFFICE VISIT (OUTPATIENT)
Dept: FAMILY MEDICINE | Facility: CLINIC | Age: 23
End: 2023-05-03
Payer: COMMERCIAL

## 2023-05-03 VITALS
RESPIRATION RATE: 16 BRPM | HEART RATE: 69 BPM | DIASTOLIC BLOOD PRESSURE: 74 MMHG | HEIGHT: 65 IN | TEMPERATURE: 97.6 F | BODY MASS INDEX: 21.86 KG/M2 | OXYGEN SATURATION: 100 % | WEIGHT: 131.2 LBS | SYSTOLIC BLOOD PRESSURE: 122 MMHG

## 2023-05-03 DIAGNOSIS — Z11.3 ROUTINE SCREENING FOR STI (SEXUALLY TRANSMITTED INFECTION): ICD-10-CM

## 2023-05-03 DIAGNOSIS — Z00.00 ROUTINE GENERAL MEDICAL EXAMINATION AT A HEALTH CARE FACILITY: Primary | ICD-10-CM

## 2023-05-03 DIAGNOSIS — Z20.6 HIV EXPOSURE: ICD-10-CM

## 2023-05-03 DIAGNOSIS — Z51.81 MEDICATION MONITORING ENCOUNTER: ICD-10-CM

## 2023-05-03 LAB
ALBUMIN SERPL BCG-MCNC: 4.9 G/DL (ref 3.5–5.2)
ALP SERPL-CCNC: 70 U/L (ref 40–129)
ALT SERPL W P-5'-P-CCNC: 24 U/L (ref 10–50)
ANION GAP SERPL CALCULATED.3IONS-SCNC: 10 MMOL/L (ref 7–15)
AST SERPL W P-5'-P-CCNC: 28 U/L (ref 10–50)
BILIRUB SERPL-MCNC: 0.3 MG/DL
BUN SERPL-MCNC: 19.4 MG/DL (ref 6–20)
CALCIUM SERPL-MCNC: 9.8 MG/DL (ref 8.6–10)
CHLORIDE SERPL-SCNC: 105 MMOL/L (ref 98–107)
CREAT SERPL-MCNC: 1.32 MG/DL (ref 0.67–1.17)
DEPRECATED HCO3 PLAS-SCNC: 28 MMOL/L (ref 22–29)
GFR SERPL CREATININE-BSD FRML MDRD: 77 ML/MIN/1.73M2
GLUCOSE SERPL-MCNC: 80 MG/DL (ref 70–99)
POTASSIUM SERPL-SCNC: 4.2 MMOL/L (ref 3.4–5.3)
PROT SERPL-MCNC: 8.2 G/DL (ref 6.4–8.3)
SODIUM SERPL-SCNC: 143 MMOL/L (ref 136–145)

## 2023-05-03 PROCEDURE — 86780 TREPONEMA PALLIDUM: CPT

## 2023-05-03 PROCEDURE — 87491 CHLMYD TRACH DNA AMP PROBE: CPT

## 2023-05-03 PROCEDURE — 87340 HEPATITIS B SURFACE AG IA: CPT

## 2023-05-03 PROCEDURE — 87389 HIV-1 AG W/HIV-1&-2 AB AG IA: CPT

## 2023-05-03 PROCEDURE — 80053 COMPREHEN METABOLIC PANEL: CPT

## 2023-05-03 PROCEDURE — 87591 N.GONORRHOEAE DNA AMP PROB: CPT

## 2023-05-03 PROCEDURE — 86803 HEPATITIS C AB TEST: CPT

## 2023-05-03 PROCEDURE — 99213 OFFICE O/P EST LOW 20 MIN: CPT | Mod: 25

## 2023-05-03 PROCEDURE — 36415 COLL VENOUS BLD VENIPUNCTURE: CPT

## 2023-05-03 PROCEDURE — 99395 PREV VISIT EST AGE 18-39: CPT

## 2023-05-03 RX ORDER — EMTRICITABINE AND TENOFOVIR DISOPROXIL FUMARATE 200; 300 MG/1; MG/1
1 TABLET, FILM COATED ORAL DAILY
Qty: 90 TABLET | Refills: 0 | Status: SHIPPED | OUTPATIENT
Start: 2023-05-03 | End: 2023-08-04

## 2023-05-03 ASSESSMENT — ENCOUNTER SYMPTOMS
HEARTBURN: 0
WEAKNESS: 0
PARESTHESIAS: 0
PALPITATIONS: 0
DIZZINESS: 0
ABDOMINAL PAIN: 0
SORE THROAT: 0
SHORTNESS OF BREATH: 0
NAUSEA: 0
CHILLS: 0
CONSTIPATION: 0
MYALGIAS: 0
HEMATOCHEZIA: 0
ARTHRALGIAS: 0
HEMATURIA: 0
HEADACHES: 0
EYE PAIN: 0
FEVER: 0
DYSURIA: 0
DIARRHEA: 0
FREQUENCY: 0
JOINT SWELLING: 0
COUGH: 0
NERVOUS/ANXIOUS: 0

## 2023-05-03 ASSESSMENT — PAIN SCALES - GENERAL: PAINLEVEL: NO PAIN (0)

## 2023-05-03 NOTE — PROGRESS NOTES
SUBJECTIVE:   CC: Óscar is an 23 year old who presents for preventative health visit.       5/3/2023     9:42 AM   Additional Questions   Patient has been advised of split billing requirements and indicates understanding: Yes  Healthy Habits:     Getting at least 3 servings of Calcium per day:  Yes    Bi-annual eye exam:  Yes    Dental care twice a year:  Yes    Sleep apnea or symptoms of sleep apnea:  Daytime drowsiness    Diet:  Regular (no restrictions)    Frequency of exercise:  2-3 days/week    Duration of exercise:  45-60 minutes    Taking medications regularly:  Yes    Medication side effects:  Not applicable    PHQ-2 Total Score: 1    Additional concerns today:  No    New to me. Not fasting.   No allergies. No concerns, needs med refills.     Today's PHQ-2 Score:       5/2/2023     9:34 PM   PHQ-2 ( 1999 [employer])   Q1: Little interest or pleasure in doing things 1   Q2: Feeling down, depressed or hopeless 0   PHQ-2 Score 1   Q1: Little interest or pleasure in doing things Several days   Q2: Feeling down, depressed or hopeless Not at all   PHQ-2 Score 1       Have you ever done Advance Care Planning? (For example, a Health Directive, POLST, or a discussion with a medical provider or your loved ones about your wishes): No, advance care planning information given to patient to review.  Patient declined advance care planning discussion at this time.    Social History     Tobacco Use     Smoking status: Never     Smokeless tobacco: Never   Vaping Use     Vaping status: Never Used     Passive vaping exposure: Yes   Substance Use Topics     Alcohol use: Yes     Comment: occasional/social              5/2/2023     9:34 PM   Alcohol Use   Prescreen: >3 drinks/day or >7 drinks/week? No       Last PSA: No results found for: PSA    Reviewed orders with patient. Reviewed health maintenance and updated orders accordingly - Yes  Lab work is in process  Labs reviewed in EPIC  BP Readings from Last 3 Encounters:   05/03/23  122/74   04/25/22 112/76   01/27/22 110/64    Wt Readings from Last 3 Encounters:   05/03/23 59.5 kg (131 lb 3.2 oz)   04/25/22 58.3 kg (128 lb 9.6 oz)   01/19/22 59 kg (130 lb)                  Patient Active Problem List   Diagnosis   (none) - all problems resolved or deleted     No past surgical history on file.    Social History     Tobacco Use     Smoking status: Never     Smokeless tobacco: Never   Vaping Use     Vaping status: Never Used     Passive vaping exposure: Yes   Substance Use Topics     Alcohol use: Yes     Comment: occasional/social      Family History   Problem Relation Age of Onset     Glaucoma No family hx of      Macular Degeneration No family hx of          Current Outpatient Medications   Medication Sig Dispense Refill     emtricitabine-tenofovir (TRUVADA) 200-300 MG per tablet Take 1 tablet by mouth daily 90 tablet 0     fluocinonide (LIDEX) 0.05 % external solution Apply to affected area on scalp BID x 2-4 weeks, tapering with improvement. Do not apply to face or body folds. 60 mL 3     ketoconazole (NIZORAL) 2 % external shampoo Wet scalp 2-3x a week, apply shampoo and lather, let sit for 3-5 minutes and then rinse. 120 mL 6     No Known Allergies  Recent Labs   Lab Test 05/03/23  1048 01/24/23  1029 07/14/21  0810 04/06/21  1539 12/22/20  1436   ALT 24  --   --   --   --    CR 1.32* 1.06   < > 1.13 0.99   GFRESTIMATED 77 >90   < > >90 >90   GFRESTBLACK  --   --   --  >90 >90   POTASSIUM 4.2  --   --   --  3.7    < > = values in this interval not displayed.        Reviewed and updated as needed this visit by clinical staff   Tobacco  Allergies  Meds              Reviewed and updated as needed this visit by Provider                 No past medical history on file.   No past surgical history on file.    Review of Systems   Constitutional: Negative for chills and fever.   HENT: Negative for congestion, ear pain, hearing loss and sore throat.    Eyes: Negative for pain and visual  "disturbance.   Respiratory: Negative for cough and shortness of breath.    Cardiovascular: Negative for chest pain, palpitations and peripheral edema.   Gastrointestinal: Negative for abdominal pain, constipation, diarrhea, heartburn, hematochezia and nausea.   Genitourinary: Negative for dysuria, frequency, genital sores, hematuria, impotence, penile discharge and urgency.   Musculoskeletal: Negative for arthralgias, joint swelling and myalgias.   Skin: Negative for rash.   Neurological: Negative for dizziness, weakness, headaches and paresthesias.   Psychiatric/Behavioral: Negative for mood changes. The patient is not nervous/anxious.      Occasional nausea. No vomiting.     OBJECTIVE:   /74   Pulse 69   Temp 97.6  F (36.4  C) (Oral)   Resp 16   Ht 1.645 m (5' 4.75\")   Wt 59.5 kg (131 lb 3.2 oz)   SpO2 100%   BMI 22.00 kg/m      Physical Exam  GENERAL: healthy, alert and no distress  EYES: Eyes grossly normal to inspection, PERRL and conjunctivae and sclerae normal  HENT: ear canals and TM's normal, nose and mouth without ulcers or lesions  NECK: no adenopathy, no asymmetry, masses, or scars and thyroid normal to palpation  RESP: lungs clear to auscultation - no rales, rhonchi or wheezes  CV: regular rate and rhythm, normal S1 S2, no S3 or S4, no murmur, click or rub, no peripheral edema and peripheral pulses strong  ABDOMEN: soft, nontender, no hepatosplenomegaly, no masses and bowel sounds normal  MS: no gross musculoskeletal defects noted, no edema  SKIN: no suspicious lesions or rashes  NEURO: Normal strength and tone, mentation intact and speech normal  PSYCH: mentation appears normal, affect normal/bright    Diagnostic Test Results:  Labs reviewed in Epic  No results found for this or any previous visit (from the past 24 hour(s)).    ASSESSMENT/PLAN:   Óscar was seen today for physical.    Diagnoses and all orders for this visit:    Routine general medical examination at [facility] facility  -     Comprehensive metabolic panel (BMP + Alb, Alk Phos, ALT, AST, Total. Bili, TP)    HIV exposure  On Truvada, no concerns with med or lab adherence. Refilled for 90 days. Standing orders for every 3 month HIV and creatinine testing. Happy to send lab only orders for other STI screening in the future to be completed at his lab follow ups if pt requests. If having symptoms should have visit.   -     emtricitabine-tenofovir (TRUVADA) 200-300 MG per tablet; Take 1 tablet by mouth daily  -     HIV Antigen Antibody Combo    Medication monitoring encounter  -     Comprehensive metabolic panel (BMP + Alb, Alk Phos, ALT, AST, Total. Bili, TP)    Routine screening for STI (sexually transmitted infection)  -     Chlamydia trachomatis/Neisseria gonorrhoeae by PCR - Clinic Collect  -     Chlamydia trachomatis/Neisseria gonorrhoeae by PCR - Clinic Collect  -     Hepatitis B surface antigen  -     Hepatitis C Screen Reflex to HCV RNA Quant and Genotype  -     Treponema Abs w Reflex to RPR and Titer            Patient has been advised of split billing requirements and indicates understanding: Yes      COUNSELING:   Reviewed preventive health counseling, as reflected in patient instructions        He reports that he has never smoked. He has never used smokeless tobacco.        JAIDA Waldron CNP  [facility]

## 2023-05-04 LAB
C TRACH DNA SPEC QL PROBE+SIG AMP: NEGATIVE
C TRACH DNA SPEC QL PROBE+SIG AMP: NEGATIVE
HBV SURFACE AG SERPL QL IA: NONREACTIVE
HCV AB SERPL QL IA: NONREACTIVE
HIV 1+2 AB+HIV1 P24 AG SERPL QL IA: NONREACTIVE
N GONORRHOEA DNA SPEC QL NAA+PROBE: NEGATIVE
N GONORRHOEA DNA SPEC QL NAA+PROBE: NEGATIVE
T PALLIDUM AB SER QL: NONREACTIVE

## 2023-07-31 ENCOUNTER — LAB (OUTPATIENT)
Dept: LAB | Facility: CLINIC | Age: 23
End: 2023-07-31
Payer: COMMERCIAL

## 2023-07-31 DIAGNOSIS — Z20.6 HIV EXPOSURE: ICD-10-CM

## 2023-07-31 LAB
CREAT SERPL-MCNC: 1.24 MG/DL (ref 0.67–1.17)
GFR SERPL CREATININE-BSD FRML MDRD: 83 ML/MIN/1.73M2
HIV 1+2 AB+HIV1 P24 AG SERPL QL IA: NONREACTIVE

## 2023-07-31 PROCEDURE — 87389 HIV-1 AG W/HIV-1&-2 AB AG IA: CPT

## 2023-07-31 PROCEDURE — 36415 COLL VENOUS BLD VENIPUNCTURE: CPT

## 2023-07-31 PROCEDURE — 82565 ASSAY OF CREATININE: CPT

## 2023-08-04 ENCOUNTER — MYC REFILL (OUTPATIENT)
Dept: FAMILY MEDICINE | Facility: CLINIC | Age: 23
End: 2023-08-04
Payer: COMMERCIAL

## 2023-08-04 DIAGNOSIS — Z20.6 HIV EXPOSURE: ICD-10-CM

## 2023-08-09 RX ORDER — EMTRICITABINE AND TENOFOVIR DISOPROXIL FUMARATE 200; 300 MG/1; MG/1
1 TABLET, FILM COATED ORAL DAILY
Qty: 90 TABLET | Refills: 0 | Status: SHIPPED | OUTPATIENT
Start: 2023-08-09 | End: 2023-11-09

## 2023-11-03 ENCOUNTER — OFFICE VISIT (OUTPATIENT)
Dept: FAMILY MEDICINE | Facility: CLINIC | Age: 23
End: 2023-11-03
Payer: COMMERCIAL

## 2023-11-03 VITALS
TEMPERATURE: 98.5 F | HEIGHT: 65 IN | WEIGHT: 130 LBS | BODY MASS INDEX: 21.66 KG/M2 | DIASTOLIC BLOOD PRESSURE: 83 MMHG | SYSTOLIC BLOOD PRESSURE: 131 MMHG | RESPIRATION RATE: 20 BRPM | HEART RATE: 60 BPM | OXYGEN SATURATION: 96 %

## 2023-11-03 DIAGNOSIS — R79.89 ELEVATED SERUM CREATININE: ICD-10-CM

## 2023-11-03 DIAGNOSIS — M54.2 NECK PAIN: Primary | ICD-10-CM

## 2023-11-03 DIAGNOSIS — G44.209 TENSION HEADACHE: ICD-10-CM

## 2023-11-03 DIAGNOSIS — Z20.6 HIV EXPOSURE: ICD-10-CM

## 2023-11-03 LAB
ALBUMIN SERPL BCG-MCNC: 4.7 G/DL (ref 3.5–5.2)
ALP SERPL-CCNC: 67 U/L (ref 40–129)
ALT SERPL W P-5'-P-CCNC: 26 U/L (ref 0–70)
ANION GAP SERPL CALCULATED.3IONS-SCNC: 9 MMOL/L (ref 7–15)
AST SERPL W P-5'-P-CCNC: 27 U/L (ref 0–45)
BILIRUB SERPL-MCNC: 0.6 MG/DL
BUN SERPL-MCNC: 14.2 MG/DL (ref 6–20)
CALCIUM SERPL-MCNC: 9.6 MG/DL (ref 8.6–10)
CHLORIDE SERPL-SCNC: 102 MMOL/L (ref 98–107)
CREAT SERPL-MCNC: 1.11 MG/DL (ref 0.67–1.17)
DEPRECATED HCO3 PLAS-SCNC: 29 MMOL/L (ref 22–29)
EGFRCR SERPLBLD CKD-EPI 2021: >90 ML/MIN/1.73M2
GLUCOSE SERPL-MCNC: 103 MG/DL (ref 70–99)
POTASSIUM SERPL-SCNC: 4 MMOL/L (ref 3.4–5.3)
PROT SERPL-MCNC: 7.7 G/DL (ref 6.4–8.3)
SODIUM SERPL-SCNC: 140 MMOL/L (ref 135–145)

## 2023-11-03 PROCEDURE — 99214 OFFICE O/P EST MOD 30 MIN: CPT

## 2023-11-03 PROCEDURE — 87389 HIV-1 AG W/HIV-1&-2 AB AG IA: CPT

## 2023-11-03 PROCEDURE — 80053 COMPREHEN METABOLIC PANEL: CPT

## 2023-11-03 PROCEDURE — 36415 COLL VENOUS BLD VENIPUNCTURE: CPT

## 2023-11-03 RX ORDER — CYCLOBENZAPRINE HCL 5 MG
5 TABLET ORAL 2 TIMES DAILY PRN
Qty: 6 TABLET | Refills: 0 | Status: SHIPPED | OUTPATIENT
Start: 2023-11-03 | End: 2024-02-05

## 2023-11-03 ASSESSMENT — ENCOUNTER SYMPTOMS: HEADACHES: 1

## 2023-11-03 NOTE — PATIENT INSTRUCTIONS
1000 mg tylenol up to 3 times per day  1-2 flexeril with significant neck pain or at bedtime    Flonase (over the counter)  Try a dose of zyrtec     If no improvement do e-visit next week, ill send course of antibiotics.

## 2023-11-03 NOTE — COMMUNITY RESOURCES LIST (ENGLISH)
11/03/2023   [facility] [employer]  N/A  For questions about this resource list or additional care needs, please contact your primary care clinic or care manager.  Phone: [phone]   Email: N/A   Address: [address] [place] [id number]   Hours: N/A        Financial Stability       Rent and mortgage payment assistance  [address]  [address] [place] [address] - Rent payment assistance Distance: 3.96 miles      In-Person, Phone/Virtual   [address] [place] [address]  Language: English  Hours: Mon 8:00 AM - 4:00 PM , Tue 8:00 AM - 7:00 PM , Wed - Thu 8:00 AM - 4:00 PM  Fees: Free   Phone: (204) 689-3478 Email: info@[facility] Website: https://[address] [place] [address].[employer]/resources/resource-centers/     2  [employer] ([facility]) [facility]  [facility] Homeless Prevention Assistance Program (FHPAP) Distance: 4.85 miles      In-Person   [address] [place] [address]  Language: English, Kinyarwanda  Hours: Mon - Fri 8:00 AM - 4:30 PM  Fees: Free   Phone: (430) 919-5354 Email: info@[employer] Website: http://www.[employer].org          Important Numbers & Websites       Emergency Services   911  [facility]   311  Poison Control   (904) 245-9158  Suicide Prevention Lifeline   (297) 259-7598 (TALK)  Child Abuse Hotline   (589) 242-1928 (4-A-Child)  Sexual Assault Hotline   (425) 630-4258 (HOPE)  National Runaway Safeline   (443) 938-9833 (RUNAWAY)  All-Options Talkline   (320) 862-2553  Substance Abuse Referral   (365) 353-2925 (HELP)

## 2023-11-03 NOTE — PROGRESS NOTES
"      Assessment & Plan     Neck pain  Acute, no red flag symptoms. Try Rx for muscle relaxer. Discussed conservative management OTC tylenol use.   - cyclobenzaprine (FLEXERIL) 5 MG tablet  Dispense: 6 tablet; Refill: 0    Tension headache  Acute/subacute. DDx includes post-viral headaches / sinusitis/rhinitis dehydration, neck tension/muscle strain.  Advised OTC / home allergy management and control neck pain. If no improvement I will send course of antibiotics or prednisone to treat bacterial sinusitis.   - Comprehensive metabolic panel (BMP + Alb, Alk Phos, ALT, AST, Total. Bili, TP)    HIV exposure  On Prep for recurrent likely exposure to HIV. Due for repeat labs. Refilled Truvada. No concerns with this med from patient.  - HIV Antigen Antibody Combo   -  emtricitabine-tenofovir (TRUVADA) 200-300 MG per tablet       Elevated serum creatinine   Prior slight elevation, have been monitoring with PREP labs. This has improved.     Ordering of each unique test  Prescription drug management         See Patient Instructions    JAIDA Waldron [facility]    Ellyn Cantrell is a 23 year old, presenting for the following health issues:  Headache and Ear Problem (Left //)        11/3/2023    12:50 PM   Additional Questions   Roomed by Ruddy JUDD MA       History of Present Illness       Headaches:   Since the patient's last clinic visit, headaches are: worsened  The patient is getting headaches:  3+ days  He is able to do normal daily activities when he has a migraine.  The patient is taking the following rescue/relief medications:  Ibuprofen (Advil, Motrin) and Tylenol   Patient states \"I get only a small amount of relief\" from the rescue/relief medications.   The patient is taking the following medications to prevent migraines:  No medications to prevent migraines  In the past 4 weeks, the patient has gone to an Urgent Care or Emergency Room 0 times times due to headaches.    He " "eats 2-3 servings of fruits and vegetables daily.He consumes 1 sweetened beverage(s) daily.He exercises with enough effort to increase his heart rate 30 to 60 minutes per day.  He exercises with enough effort to increase his heart rate 4 days per week.   He is taking medications regularly.     No history of migraines, no medications.   Recent URI, took dayquil.     Most bothersome symptom is left neck/shooting pain. Intermittent.  No ear pain. No tinnitus or difficulty hearing.       No concern with SDOH. Declines care coordinator referral.                 Review of Systems   HENT:  Positive for ear pain.    Neurological:  Positive for headaches.      Constitutional, HEENT, cardiovascular, pulmonary, gi and gu systems are negative, except as otherwise noted.      Objective    /83 (BP Location: Left arm, Patient Position: Chair, Cuff Size: Adult Regular)   Pulse 60   Temp 98.5  F (36.9  C) (Oral)   Resp 20   Ht 1.651 m (5' 5\")   Wt 59 kg (130 lb)   SpO2 96%   BMI 21.63 kg/m    Body mass index is 21.63 kg/m .  Physical Exam   GENERAL: healthy, alert and no distress  HEENT: eyes normal. Mild nasal congestion. Mouth with mild-moderate erythema in posterior oropharynx.   NECK: mild cervical lymphadenopathy on left, no adenopathy, no asymmetry, masses, or scars and thyroid normal to palpation  RESP: lungs clear to auscultation - no rales, rhonchi or wheezes  CV: regular rate and rhythm, normal S1 S2, no S3 or S4, no murmur, click or rub, no peripheral edema and peripheral pulses strong  ABDOMEN: soft, nontender, no hepatosplenomegaly, no masses and bowel sounds normal  MS: no gross musculoskeletal defects noted, no edema    Results for orders placed or performed in visit on 11/03/23   HIV Antigen Antibody Combo     Status: Normal   Result Value Ref Range    HIV Antigen Antibody Combo Nonreactive Nonreactive   Comprehensive metabolic panel (BMP + Alb, Alk Phos, ALT, AST, Total. Bili, TP)     Status: Abnormal "   Result Value Ref Range    Sodium 140 135 - 145 mmol/L    Potassium 4.0 3.4 - 5.3 mmol/L    Carbon Dioxide (CO2) 29 22 - 29 mmol/L    Anion Gap 9 7 - 15 mmol/L    Urea Nitrogen 14.2 6.0 - 20.0 mg/dL    Creatinine 1.11 0.67 - 1.17 mg/dL    GFR Estimate >90 >60 mL/min/1.73m2    Calcium 9.6 8.6 - 10.0 mg/dL    Chloride 102 98 - 107 mmol/L    Glucose 103 (H) 70 - 99 mg/dL    Alkaline Phosphatase 67 40 - 129 U/L    AST 27 0 - 45 U/L    ALT 26 0 - 70 U/L    Protein Total 7.7 6.4 - 8.3 g/dL    Albumin 4.7 3.5 - 5.2 g/dL    Bilirubin Total 0.6 <=1.2 mg/dL

## 2023-11-04 LAB — HIV 1+2 AB+HIV1 P24 AG SERPL QL IA: NONREACTIVE

## 2023-11-09 RX ORDER — EMTRICITABINE AND TENOFOVIR DISOPROXIL FUMARATE 200; 300 MG/1; MG/1
1 TABLET, FILM COATED ORAL DAILY
Qty: 90 TABLET | Refills: 0 | Status: SHIPPED | OUTPATIENT
Start: 2023-11-09 | End: 2024-02-07

## 2023-12-28 ENCOUNTER — MYC REFILL (OUTPATIENT)
Dept: FAMILY MEDICINE | Facility: CLINIC | Age: 23
End: 2023-12-28
Payer: COMMERCIAL

## 2023-12-28 DIAGNOSIS — L29.9 LOCALIZED PRURITUS: ICD-10-CM

## 2023-12-28 DIAGNOSIS — L21.9 SEBORRHEIC DERMATITIS: ICD-10-CM

## 2023-12-29 RX ORDER — KETOCONAZOLE 20 MG/ML
SHAMPOO TOPICAL
Qty: 120 ML | Refills: 6 | Status: SHIPPED | OUTPATIENT
Start: 2023-12-29

## 2024-01-25 ENCOUNTER — MYC MEDICAL ADVICE (OUTPATIENT)
Dept: FAMILY MEDICINE | Facility: CLINIC | Age: 24
End: 2024-01-25

## 2024-01-25 ENCOUNTER — OFFICE VISIT (OUTPATIENT)
Dept: OPHTHALMOLOGY | Facility: CLINIC | Age: 24
End: 2024-01-25
Payer: COMMERCIAL

## 2024-01-25 DIAGNOSIS — Z11.3 ROUTINE SCREENING FOR STI (SEXUALLY TRANSMITTED INFECTION): Primary | ICD-10-CM

## 2024-01-25 DIAGNOSIS — Z98.890 S/P LASIK SURGERY: Primary | ICD-10-CM

## 2024-01-25 DIAGNOSIS — Z51.81 MEDICATION MONITORING ENCOUNTER: ICD-10-CM

## 2024-01-25 DIAGNOSIS — D31.32 NEVUS OF CHOROID OF LEFT EYE: ICD-10-CM

## 2024-01-25 PROCEDURE — 92014 COMPRE OPH EXAM EST PT 1/>: CPT | Performed by: OPTOMETRIST

## 2024-01-25 ASSESSMENT — SLIT LAMP EXAM - LIDS
COMMENTS: NORMAL
COMMENTS: NORMAL

## 2024-01-25 ASSESSMENT — CUP TO DISC RATIO
OS_RATIO: 0.35
OD_RATIO: 0.35

## 2024-01-25 ASSESSMENT — KERATOMETRY
OS_K1POWER_DIOPTERS: 8.15
OS_AXISANGLE2_DEGREES: 011
OD_AXISANGLE2_DEGREES: 172
OD_K2POWER_DIOPTERS: 8.08
OS_K2POWER_DIOPTERS: 8.01
OD_K1POWER_DIOPTERS: 8.27

## 2024-01-25 ASSESSMENT — TONOMETRY
OD_IOP_MMHG: 13
OS_IOP_MMHG: 14
IOP_METHOD: ICARE

## 2024-01-25 ASSESSMENT — EXTERNAL EXAM - RIGHT EYE: OD_EXAM: NORMAL

## 2024-01-25 ASSESSMENT — REFRACTION
OD_SPHERE: +0.25
OS_SPHERE: +0.50

## 2024-01-25 ASSESSMENT — CONF VISUAL FIELD
OS_SUPERIOR_TEMPORAL_RESTRICTION: 0
OS_INFERIOR_NASAL_RESTRICTION: 0
OS_SUPERIOR_NASAL_RESTRICTION: 0
OS_INFERIOR_TEMPORAL_RESTRICTION: 0
OD_NORMAL: 1
OD_SUPERIOR_TEMPORAL_RESTRICTION: 0
OD_SUPERIOR_NASAL_RESTRICTION: 0
METHOD: COUNTING FINGERS
OS_NORMAL: 1
OD_INFERIOR_TEMPORAL_RESTRICTION: 0
OD_INFERIOR_NASAL_RESTRICTION: 0

## 2024-01-25 ASSESSMENT — VISUAL ACUITY
METHOD_MR: DECLINES MRX.
METHOD: SNELLEN - LINEAR
OD_SC: 20/20
OS_SC: 20/20
OD_SC+: -1

## 2024-01-25 ASSESSMENT — EXTERNAL EXAM - LEFT EYE: OS_EXAM: NORMAL

## 2024-01-25 NOTE — NURSING NOTE
Chief Complaints and History of Present Illnesses   Patient presents with    Annual Eye Exam     Pt here for annual eye exam.     Chief Complaint(s) and History of Present Illness(es)       Annual Eye Exam              Laterality: both eyes    Comments: Pt here for annual eye exam.              Comments    Pt has largely unchanged vision since last exam. No new concerns.   Not using drops.   Elisabet Hale, COA on 1/25/2024 at 1:06 PM

## 2024-01-25 NOTE — TELEPHONE ENCOUNTER
Routing to Pedro Wiley.    Patient asking for rectal swap for STI when he comes in 1/31 for prep labs.    Pended if agreeble    Dat Astorga, RN, BSN, PHN  [facility]

## 2024-01-31 ENCOUNTER — IMMUNIZATION (OUTPATIENT)
Dept: FAMILY MEDICINE | Facility: CLINIC | Age: 24
End: 2024-01-31
Payer: COMMERCIAL

## 2024-01-31 ENCOUNTER — LAB (OUTPATIENT)
Dept: LAB | Facility: CLINIC | Age: 24
End: 2024-01-31
Payer: COMMERCIAL

## 2024-01-31 DIAGNOSIS — Z11.3 ROUTINE SCREENING FOR STI (SEXUALLY TRANSMITTED INFECTION): ICD-10-CM

## 2024-01-31 DIAGNOSIS — Z51.81 MEDICATION MONITORING ENCOUNTER: ICD-10-CM

## 2024-01-31 DIAGNOSIS — Z23 ENCOUNTER FOR IMMUNIZATION: Primary | ICD-10-CM

## 2024-01-31 LAB
HBV SURFACE AG SERPL QL IA: NONREACTIVE
HCV AB SERPL QL IA: NONREACTIVE
T PALLIDUM AB SER QL: NONREACTIVE

## 2024-01-31 PROCEDURE — 87591 N.GONORRHOEAE DNA AMP PROB: CPT

## 2024-01-31 PROCEDURE — 86803 HEPATITIS C AB TEST: CPT

## 2024-01-31 PROCEDURE — 36415 COLL VENOUS BLD VENIPUNCTURE: CPT

## 2024-01-31 PROCEDURE — 91320 SARSCV2 VAC 30MCG TRS-SUC IM: CPT

## 2024-01-31 PROCEDURE — 87491 CHLMYD TRACH DNA AMP PROBE: CPT

## 2024-01-31 PROCEDURE — 90480 ADMN SARSCOV2 VAC 1/ONLY CMP: CPT

## 2024-01-31 PROCEDURE — 80053 COMPREHEN METABOLIC PANEL: CPT

## 2024-01-31 PROCEDURE — 87389 HIV-1 AG W/HIV-1&-2 AB AG IA: CPT

## 2024-01-31 PROCEDURE — 99207 PR NO CHARGE NURSE ONLY: CPT

## 2024-01-31 PROCEDURE — 86780 TREPONEMA PALLIDUM: CPT

## 2024-01-31 PROCEDURE — 87340 HEPATITIS B SURFACE AG IA: CPT

## 2024-01-31 NOTE — PROGRESS NOTES
Prior to immunization administration, verified patients identity using patient s name and date of birth. Please see Immunization Activity for additional information.     Screening Questionnaire for Adult Immunization    Are you sick today?   No   Do you have allergies to medications, food, a vaccine component or latex?   No   Have you ever had a serious reaction after receiving a vaccination?   No   Do you have a long-term health problem with heart, lung, kidney, or metabolic disease (e.g., diabetes), asthma, a blood disorder, no spleen, complement component deficiency, a cochlear implant, or a spinal fluid leak?  Are you on long-term aspirin therapy?   No   Do you have cancer, leukemia, HIV/AIDS, or any other immune system problem?   No   Do you have a parent, brother, or sister with an immune system problem?   No   In the past 3 months, have you taken medications that affect  your immune system, such as prednisone, other steroids, or anticancer drugs; drugs for the treatment of rheumatoid arthritis, Crohn s disease, or psoriasis; or have you had radiation treatments?   No   Have you had a seizure, or a brain or other nervous system problem?   No   During the past year, have you received a transfusion of blood or blood    products, or been given immune (gamma) globulin or antiviral drug?   No   For women: Are you pregnant or is there a chance you could become       pregnant during the next month?   No   Have you received any vaccinations in the past 4 weeks?   No     Immunization questionnaire answers were all negative.    I have reviewed the following standing orders:   This patient is due and qualifies for the Covid-19 vaccine.     Click here for COVID-19 Standing Order    I have reviewed the vaccines inclusion and exclusion criteria; No concerns regarding eligibility.     Patient instructed to remain in clinic for 15 minutes afterwards, and to report any adverse reactions.     Screening performed by Dina JONES  Rush on 1/31/2024 at 9:34 AM.

## 2024-02-01 LAB
C TRACH DNA SPEC QL PROBE+SIG AMP: NEGATIVE
C TRACH DNA SPEC QL PROBE+SIG AMP: NEGATIVE
N GONORRHOEA DNA SPEC QL NAA+PROBE: NEGATIVE
N GONORRHOEA DNA SPEC QL NAA+PROBE: NEGATIVE

## 2024-02-02 LAB
ALBUMIN SERPL BCG-MCNC: 4.4 G/DL (ref 3.5–5.2)
ALP SERPL-CCNC: 63 U/L (ref 40–150)
ALT SERPL W P-5'-P-CCNC: 23 U/L (ref 0–70)
ANION GAP SERPL CALCULATED.3IONS-SCNC: 15 MMOL/L (ref 7–15)
AST SERPL W P-5'-P-CCNC: 23 U/L (ref 0–45)
BILIRUB SERPL-MCNC: 0.3 MG/DL
BUN SERPL-MCNC: 19.8 MG/DL (ref 6–20)
CALCIUM SERPL-MCNC: 9.5 MG/DL (ref 8.6–10)
CHLORIDE SERPL-SCNC: 102 MMOL/L (ref 98–107)
CREAT SERPL-MCNC: 1.19 MG/DL (ref 0.67–1.17)
DEPRECATED HCO3 PLAS-SCNC: 24 MMOL/L (ref 22–29)
EGFRCR SERPLBLD CKD-EPI 2021: 87 ML/MIN/1.73M2
GLUCOSE SERPL-MCNC: 137 MG/DL (ref 70–99)
HIV 1+2 AB+HIV1 P24 AG SERPL QL IA: NONREACTIVE
POTASSIUM SERPL-SCNC: 4.3 MMOL/L (ref 3.4–5.3)
PROT SERPL-MCNC: 7.2 G/DL (ref 6.4–8.3)
SODIUM SERPL-SCNC: 141 MMOL/L (ref 135–145)

## 2024-02-05 DIAGNOSIS — R73.01 ELEVATED FASTING GLUCOSE: ICD-10-CM

## 2024-02-05 DIAGNOSIS — Z29.81 ENCOUNTER FOR HIV PRE-EXPOSURE PROPHYLAXIS: ICD-10-CM

## 2024-02-05 DIAGNOSIS — R79.89 ELEVATED SERUM CREATININE: ICD-10-CM

## 2024-02-05 DIAGNOSIS — Z51.81 MEDICATION MONITORING ENCOUNTER: Primary | ICD-10-CM

## 2024-02-07 ENCOUNTER — MYC REFILL (OUTPATIENT)
Dept: FAMILY MEDICINE | Facility: CLINIC | Age: 24
End: 2024-02-07
Payer: COMMERCIAL

## 2024-02-07 DIAGNOSIS — Z20.6 HIV EXPOSURE: ICD-10-CM

## 2024-02-09 RX ORDER — EMTRICITABINE AND TENOFOVIR DISOPROXIL FUMARATE 200; 300 MG/1; MG/1
1 TABLET, FILM COATED ORAL DAILY
Qty: 90 TABLET | Refills: 0 | Status: SHIPPED | OUTPATIENT
Start: 2024-02-09 | End: 2024-05-12

## 2024-04-29 ENCOUNTER — APPOINTMENT (OUTPATIENT)
Dept: URBAN - METROPOLITAN AREA CLINIC 255 | Age: 24
Setting detail: DERMATOLOGY
End: 2024-04-29

## 2024-04-29 VITALS — WEIGHT: 145 LBS | HEIGHT: 64 IN

## 2024-04-29 DIAGNOSIS — Q826 OTHER SPECIFIED ANOMALIES OF SKIN: ICD-10-CM

## 2024-04-29 DIAGNOSIS — Q828 OTHER SPECIFIED ANOMALIES OF SKIN: ICD-10-CM

## 2024-04-29 DIAGNOSIS — L40.8 OTHER PSORIASIS: ICD-10-CM

## 2024-04-29 DIAGNOSIS — Q819 OTHER SPECIFIED ANOMALIES OF SKIN: ICD-10-CM

## 2024-04-29 DIAGNOSIS — L70.0 ACNE VULGARIS: ICD-10-CM

## 2024-04-29 PROBLEM — L85.8 OTHER SPECIFIED EPIDERMAL THICKENING: Status: ACTIVE | Noted: 2024-04-29

## 2024-04-29 PROCEDURE — OTHER PRESCRIPTION MEDICATION MANAGEMENT: OTHER

## 2024-04-29 PROCEDURE — OTHER OTC TREATMENT REGIMEN: OTHER

## 2024-04-29 PROCEDURE — OTHER COUNSELING: OTHER

## 2024-04-29 PROCEDURE — 99204 OFFICE O/P NEW MOD 45 MIN: CPT

## 2024-04-29 PROCEDURE — OTHER MIPS QUALITY: OTHER

## 2024-04-29 PROCEDURE — OTHER PRESCRIPTION: OTHER

## 2024-04-29 RX ORDER — KETOCONAZOLE 20 MG/ML
SHAMPOO, SUSPENSION TOPICAL QD
Qty: 120 | Refills: 12 | Status: ERX | COMMUNITY
Start: 2024-04-29

## 2024-04-29 RX ORDER — FLUOCINOLONE ACETONIDE 0.11 MG/ML
OIL AURICULAR (OTIC)
Qty: 20 | Refills: 2 | Status: ERX | COMMUNITY
Start: 2024-04-29

## 2024-04-29 RX ORDER — CLOBETASOL PROPIONATE 0.5 MG/ML
SOLUTION TOPICAL
Qty: 50 | Refills: 1 | Status: ERX | COMMUNITY
Start: 2024-04-29

## 2024-04-29 RX ORDER — CLINDAMYCIN PHOSPHATE 10 MG/G
GEL TOPICAL
Qty: 30 | Refills: 3 | Status: ERX | COMMUNITY
Start: 2024-04-29

## 2024-04-29 RX ORDER — CICLOPIROX 10 MG/.96ML
SHAMPOO TOPICAL QHS
Qty: 120 | Refills: 12 | Status: ERX | COMMUNITY
Start: 2024-04-29

## 2024-04-29 RX ORDER — TRETIONIN 0.25 MG/G
CREAM TOPICAL QHS
Qty: 45 | Refills: 5 | Status: ERX | COMMUNITY
Start: 2024-04-29

## 2024-04-29 ASSESSMENT — LOCATION DETAILED DESCRIPTION DERM
LOCATION DETAILED: LEFT PROXIMAL POSTERIOR UPPER ARM
LOCATION DETAILED: RIGHT INFERIOR MEDIAL FOREHEAD
LOCATION DETAILED: LEFT INFERIOR CRUS OF ANTIHELIX
LOCATION DETAILED: RIGHT SUPERIOR CRUS OF ANTIHELIX
LOCATION DETAILED: SUPERIOR THORACIC SPINE
LOCATION DETAILED: LEFT INFERIOR CENTRAL MALAR CHEEK
LOCATION DETAILED: RIGHT PROXIMAL POSTERIOR UPPER ARM
LOCATION DETAILED: MID-OCCIPITAL SCALP
LOCATION DETAILED: RIGHT INFERIOR CENTRAL MALAR CHEEK

## 2024-04-29 ASSESSMENT — SEVERITY ASSESSMENT: SEVERITY: MILD

## 2024-04-29 ASSESSMENT — LOCATION SIMPLE DESCRIPTION DERM
LOCATION SIMPLE: RIGHT UPPER ARM
LOCATION SIMPLE: LEFT UPPER ARM
LOCATION SIMPLE: RIGHT CHEEK
LOCATION SIMPLE: LEFT CHEEK
LOCATION SIMPLE: RIGHT EAR
LOCATION SIMPLE: RIGHT FOREHEAD
LOCATION SIMPLE: UPPER BACK
LOCATION SIMPLE: POSTERIOR SCALP
LOCATION SIMPLE: LEFT EAR

## 2024-04-29 ASSESSMENT — LOCATION ZONE DERM
LOCATION ZONE: ARM
LOCATION ZONE: SCALP
LOCATION ZONE: EAR
LOCATION ZONE: TRUNK
LOCATION ZONE: FACE

## 2024-04-29 NOTE — PROCEDURE: COUNSELING
Detail Level: Zone
Birth Control Pills Counseling: Birth Control Pill Counseling: I discussed with the patient the potential side effects of OCPs including but not limited to increased risk of stroke, heart attack, thrombophlebitis, deep venous thrombosis, hepatic adenomas, breast changes, GI upset, headaches, and depression.  The patient verbalized understanding of the proper use and possible adverse effects of OCPs. All of the patient's questions and concerns were addressed.
Topical Sulfur Applications Pregnancy And Lactation Text: This medication is Pregnancy Category C and has an unknown safety profile during pregnancy. It is unknown if this topical medication is excreted in breast milk.
Sarecycline Counseling: Patient advised regarding possible photosensitivity and discoloration of the teeth, skin, lips, tongue and gums.  Patient instructed to avoid sunlight, if possible.  When exposed to sunlight, patients should wear protective clothing, sunglasses, and sunscreen.  The patient was instructed to call the office immediately if the following severe adverse effects occur:  hearing changes, easy bruising/bleeding, severe headache, or vision changes.  The patient verbalized understanding of the proper use and possible adverse effects of sarecycline.  All of the patient's questions and concerns were addressed.
Aklief Pregnancy And Lactation Text: It is unknown if this medication is safe to use during pregnancy.  It is unknown if this medication is excreted in breast milk.  Breastfeeding women should use the topical cream on the smallest area of the skin for the shortest time needed while breastfeeding.  Do not apply to nipple and areola.
Bactrim Counseling:  I discussed with the patient the risks of sulfa antibiotics including but not limited to GI upset, allergic reaction, drug rash, diarrhea, dizziness, photosensitivity, and yeast infections.  Rarely, more serious reactions can occur including but not limited to aplastic anemia, agranulocytosis, methemoglobinemia, blood dyscrasias, liver or kidney failure, lung infiltrates or desquamative/blistering drug rashes.
Winlevi Pregnancy And Lactation Text: This medication is considered safe during pregnancy and breastfeeding.
Tazorac Counseling:  Patient advised that medication is irritating and drying.  Patient may need to apply sparingly and wash off after an hour before eventually leaving it on overnight.  The patient verbalized understanding of the proper use and possible adverse effects of tazorac.  All of the patient's questions and concerns were addressed.
Doxycycline Pregnancy And Lactation Text: This medication is Pregnancy Category D and not consider safe during pregnancy. It is also excreted in breast milk but is considered safe for shorter treatment courses.
Minocycline Counseling: Patient advised regarding possible photosensitivity and discoloration of the teeth, skin, lips, tongue and gums.  Patient instructed to avoid sunlight, if possible.  When exposed to sunlight, patients should wear protective clothing, sunglasses, and sunscreen.  The patient was instructed to call the office immediately if the following severe adverse effects occur:  hearing changes, easy bruising/bleeding, severe headache, or vision changes.  The patient verbalized understanding of the proper use and possible adverse effects of minocycline.  All of the patient's questions and concerns were addressed.
Tetracycline Pregnancy And Lactation Text: This medication is Pregnancy Category D and not consider safe during pregnancy. It is also excreted in breast milk.
Topical Retinoid counseling:  Patient advised to apply a pea-sized amount only at bedtime and wait 30 minutes after washing their face before applying.  If too drying, patient may add a non-comedogenic moisturizer. The patient verbalized understanding of the proper use and possible adverse effects of retinoids.  All of the patient's questions and concerns were addressed.
Erythromycin Pregnancy And Lactation Text: This medication is Pregnancy Category B and is considered safe during pregnancy. It is also excreted in breast milk.
Benzoyl Peroxide Counseling: Patient counseled that medicine may cause skin irritation and bleach clothing.  In the event of skin irritation, the patient was advised to reduce the amount of the drug applied or use it less frequently.   The patient verbalized understanding of the proper use and possible adverse effects of benzoyl peroxide.  All of the patient's questions and concerns were addressed.
Azithromycin Counseling:  I discussed with the patient the risks of azithromycin including but not limited to GI upset, allergic reaction, drug rash, diarrhea, and yeast infections.
Tazorac Pregnancy And Lactation Text: This medication is not safe during pregnancy. It is unknown if this medication is excreted in breast milk.
Detail Level: Detailed
Topical Clindamycin Pregnancy And Lactation Text: This medication is Pregnancy Category B and is considered safe during pregnancy. It is unknown if it is excreted in breast milk.
Dapsone Pregnancy And Lactation Text: This medication is Pregnancy Category C and is not considered safe during pregnancy or breast feeding.
High Dose Vitamin A Counseling: Side effects reviewed, pt to contact office should one occur.
Spironolactone Pregnancy And Lactation Text: This medication can cause feminization of the male fetus and should be avoided during pregnancy. The active metabolite is also found in breast milk.
Aklief counseling:  Patient advised to apply a pea-sized amount only at bedtime and wait 30 minutes after washing their face before applying.  If too drying, patient may add a non-comedogenic moisturizer.  The most commonly reported side effects including irritation, redness, scaling, dryness, stinging, burning, itching, and increased risk of sunburn.  The patient verbalized understanding of the proper use and possible adverse effects of retinoids.  All of the patient's questions and concerns were addressed.
Topical Retinoid Pregnancy And Lactation Text: This medication is Pregnancy Category C. It is unknown if this medication is excreted in breast milk.
Bactrim Pregnancy And Lactation Text: This medication is Pregnancy Category D and is known to cause fetal risk.  It is also excreted in breast milk.
Isotretinoin Counseling: Patient should get monthly blood tests, not donate blood, not drive at night if vision affected, not share medication, and not undergo elective surgery for 6 months after tx completed. Side effects reviewed, pt to contact office should one occur.
Azelaic Acid Counseling: Patient counseled that medicine may cause skin irritation and to avoid applying near the eyes.  In the event of skin irritation, the patient was advised to reduce the amount of the drug applied or use it less frequently.   The patient verbalized understanding of the proper use and possible adverse effects of azelaic acid.  All of the patient's questions and concerns were addressed.
Birth Control Pills Pregnancy And Lactation Text: This medication should be avoided if pregnant and for the first 30 days post-partum.
Include Pregnancy/Lactation Warning?: No
Winlevi Counseling:  I discussed with the patient the risks of topical clascoterone including but not limited to erythema, scaling, itching, and stinging. Patient voiced their understanding.
Erythromycin Counseling:  I discussed with the patient the risks of erythromycin including but not limited to GI upset, allergic reaction, drug rash, diarrhea, increase in liver enzymes, and yeast infections.
Dapsone Counseling: I discussed with the patient the risks of dapsone including but not limited to hemolytic anemia, agranulocytosis, rashes, methemoglobinemia, kidney failure, peripheral neuropathy, headaches, GI upset, and liver toxicity.  Patients who start dapsone require monitoring including baseline LFTs and weekly CBCs for the first month, then every month thereafter.  The patient verbalized understanding of the proper use and possible adverse effects of dapsone.  All of the patient's questions and concerns were addressed.
Doxycycline Counseling:  Patient counseled regarding possible photosensitivity and increased risk for sunburn.  Patient instructed to avoid sunlight, if possible.  When exposed to sunlight, patients should wear protective clothing, sunglasses, and sunscreen.  The patient was instructed to call the office immediately if the following severe adverse effects occur:  hearing changes, easy bruising/bleeding, severe headache, or vision changes.  The patient verbalized understanding of the proper use and possible adverse effects of doxycycline.  All of the patient's questions and concerns were addressed.
Topical Clindamycin Counseling: Patient counseled that this medication may cause skin irritation or allergic reactions.  In the event of skin irritation, the patient was advised to reduce the amount of the drug applied or use it less frequently.   The patient verbalized understanding of the proper use and possible adverse effects of clindamycin.  All of the patient's questions and concerns were addressed.
High Dose Vitamin A Pregnancy And Lactation Text: High dose vitamin A therapy is contraindicated during pregnancy and breast feeding.
Tetracycline Counseling: Patient counseled regarding possible photosensitivity and increased risk for sunburn.  Patient instructed to avoid sunlight, if possible.  When exposed to sunlight, patients should wear protective clothing, sunglasses, and sunscreen.  The patient was instructed to call the office immediately if the following severe adverse effects occur:  hearing changes, easy bruising/bleeding, severe headache, or vision changes.  The patient verbalized understanding of the proper use and possible adverse effects of tetracycline.  All of the patient's questions and concerns were addressed. Patient understands to avoid pregnancy while on therapy due to potential birth defects.
Benzoyl Peroxide Pregnancy And Lactation Text: This medication is Pregnancy Category C. It is unknown if benzoyl peroxide is excreted in breast milk.
Azithromycin Pregnancy And Lactation Text: This medication is considered safe during pregnancy and is also secreted in breast milk.
Spironolactone Counseling: Patient advised regarding risks of diarrhea, abdominal pain, hyperkalemia, birth defects (for female patients), liver toxicity and renal toxicity. The patient may need blood work to monitor liver and kidney function and potassium levels while on therapy. The patient verbalized understanding of the proper use and possible adverse effects of spironolactone.  All of the patient's questions and concerns were addressed.
Topical Sulfur Applications Counseling: Topical Sulfur Counseling: Patient counseled that this medication may cause skin irritation or allergic reactions.  In the event of skin irritation, the patient was advised to reduce the amount of the drug applied or use it less frequently.   The patient verbalized understanding of the proper use and possible adverse effects of topical sulfur application.  All of the patient's questions and concerns were addressed.
Isotretinoin Pregnancy And Lactation Text: This medication is Pregnancy Category X and is considered extremely dangerous during pregnancy. It is unknown if it is excreted in breast milk.
Azelaic Acid Pregnancy And Lactation Text: This medication is considered safe during pregnancy and breast feeding.
Detail Level: Simple

## 2024-04-29 NOTE — PROCEDURE: PRESCRIPTION MEDICATION MANAGEMENT
Render In Strict Bullet Format?: No
Initiate Treatment: clobetasol 0.05 % scalp solution, apply thin layer to scalp nightly. Leave on overnight and rinse in morning x 2 weeks for current flare\\n\\nDermOtic Oil 0.01 % ear drops, apply 1-2 drops to each ear nightly x 2 weeks then d/c and use only sparingly PRN flare\\n\\nciclopirox 1 % shampoo, apply to scalp three-4 times a week. Leave on scalp for 5 minutes, then rinse\\nketoconazole 2 % shampoo TP Frequency: QD Sig: Apply to scalp 3-4 times per week. Leave on scalp for 5 minutes, then rinse. \\n\\nAlternate Ciclopirox shampoo with Keto2 % shampoo x 2 weeks then taper to one weekly for each, using regular shampoo on other days. I recommend Hello [place] or regular non-medicated shampoo.
Detail Level: Zone
Plan: Consider orals next
Initiate Treatment: clindamycin 1 % topical gel, apply to face to spot treat acne flares.\\n\\ntretinoin 0.025 % topical cream, apply a pea sized amount to face 3x weekly, if well tolerated, increased to once nightly as tolerated for acne. Follow with a moisturizer.
Discontinue Regimen: SA

## 2024-04-29 NOTE — PROCEDURE: OTC TREATMENT REGIMEN
Patient Specific Otc Recommendations (Will Not Stick From Patient To Patient): Apply Amlactin lotion once daily to arms
Detail Level: Zone

## 2024-05-03 ENCOUNTER — LAB (OUTPATIENT)
Dept: LAB | Facility: CLINIC | Age: 24
End: 2024-05-03
Payer: COMMERCIAL

## 2024-05-03 DIAGNOSIS — Z29.81 ENCOUNTER FOR HIV PRE-EXPOSURE PROPHYLAXIS: ICD-10-CM

## 2024-05-03 DIAGNOSIS — R73.01 ELEVATED FASTING GLUCOSE: ICD-10-CM

## 2024-05-03 DIAGNOSIS — R79.89 ELEVATED SERUM CREATININE: ICD-10-CM

## 2024-05-03 LAB — HBA1C MFR BLD: 5.7 % (ref 0–5.6)

## 2024-05-03 PROCEDURE — 80048 BASIC METABOLIC PNL TOTAL CA: CPT

## 2024-05-03 PROCEDURE — 84443 ASSAY THYROID STIM HORMONE: CPT

## 2024-05-03 PROCEDURE — 36415 COLL VENOUS BLD VENIPUNCTURE: CPT

## 2024-05-03 PROCEDURE — 83036 HEMOGLOBIN GLYCOSYLATED A1C: CPT

## 2024-05-03 PROCEDURE — 87389 HIV-1 AG W/HIV-1&-2 AB AG IA: CPT

## 2024-05-04 LAB
ANION GAP SERPL CALCULATED.3IONS-SCNC: 10 MMOL/L (ref 7–15)
BUN SERPL-MCNC: 21.4 MG/DL (ref 6–20)
CALCIUM SERPL-MCNC: 9.6 MG/DL (ref 8.6–10)
CHLORIDE SERPL-SCNC: 103 MMOL/L (ref 98–107)
CREAT SERPL-MCNC: 1.05 MG/DL (ref 0.67–1.17)
DEPRECATED HCO3 PLAS-SCNC: 27 MMOL/L (ref 22–29)
EGFRCR SERPLBLD CKD-EPI 2021: >90 ML/MIN/1.73M2
GLUCOSE SERPL-MCNC: 95 MG/DL (ref 70–99)
HIV 1+2 AB+HIV1 P24 AG SERPL QL IA: NONREACTIVE
POTASSIUM SERPL-SCNC: 4.1 MMOL/L (ref 3.4–5.3)
SODIUM SERPL-SCNC: 140 MMOL/L (ref 135–145)
TSH SERPL DL<=0.005 MIU/L-ACNC: 3.02 UIU/ML (ref 0.3–4.2)

## 2024-05-12 ENCOUNTER — MYC REFILL (OUTPATIENT)
Dept: FAMILY MEDICINE | Facility: CLINIC | Age: 24
End: 2024-05-12
Payer: COMMERCIAL

## 2024-05-12 DIAGNOSIS — Z20.6 HIV EXPOSURE: ICD-10-CM

## 2024-05-13 RX ORDER — EMTRICITABINE AND TENOFOVIR DISOPROXIL FUMARATE 200; 300 MG/1; MG/1
1 TABLET, FILM COATED ORAL DAILY
Qty: 90 TABLET | Refills: 0 | Status: SHIPPED | OUTPATIENT
Start: 2024-05-13

## 2024-06-23 ENCOUNTER — HEALTH MAINTENANCE LETTER (OUTPATIENT)
Age: 24
End: 2024-06-23

## 2024-07-15 ENCOUNTER — MYC MEDICAL ADVICE (OUTPATIENT)
Dept: FAMILY MEDICINE | Facility: CLINIC | Age: 24
End: 2024-07-15
Payer: COMMERCIAL

## 2024-07-15 NOTE — TELEPHONE ENCOUNTER
[place], would you be willing to fill out his physical form for school?   His last physical with you was on 05/03/23.     Ruddy Carpio MA

## 2024-07-15 NOTE — TELEPHONE ENCOUNTER
Team,    Please reach out to pt and help schedule a physical. He can have the form filled out at his physical.     Thanks,  ZACKARY Norton  [facility]

## 2024-07-17 NOTE — TELEPHONE ENCOUNTER
Yes, I'm fine with signing, I had an in person appoitnemtn with him in November 2023 as well. Please print and put inbox thanks!!

## 2024-07-18 NOTE — TELEPHONE ENCOUNTER
Printed form and also immunization records.    Placed in Pedro Wiley CNP sign me folder.    LAMBERT Hurtado  [facility]

## 2024-07-25 NOTE — TELEPHONE ENCOUNTER
Emailed form to [email or web address].    Placed in stat scanning.    LAMBERT Hurtado  [facility]

## 2025-07-12 ENCOUNTER — HEALTH MAINTENANCE LETTER (OUTPATIENT)
Age: 25
End: 2025-07-12